# Patient Record
Sex: FEMALE | Race: WHITE | Employment: PART TIME | URBAN - METROPOLITAN AREA
[De-identification: names, ages, dates, MRNs, and addresses within clinical notes are randomized per-mention and may not be internally consistent; named-entity substitution may affect disease eponyms.]

---

## 2017-06-15 ENCOUNTER — OFFICE VISIT (OUTPATIENT)
Dept: INTERNAL MEDICINE CLINIC | Age: 57
End: 2017-06-15

## 2017-06-15 VITALS
SYSTOLIC BLOOD PRESSURE: 134 MMHG | HEIGHT: 62 IN | DIASTOLIC BLOOD PRESSURE: 84 MMHG | RESPIRATION RATE: 18 BRPM | OXYGEN SATURATION: 99 % | BODY MASS INDEX: 23.55 KG/M2 | WEIGHT: 128 LBS | TEMPERATURE: 98.6 F | HEART RATE: 93 BPM

## 2017-06-15 DIAGNOSIS — Z12.31 SCREENING MAMMOGRAM, ENCOUNTER FOR: ICD-10-CM

## 2017-06-15 DIAGNOSIS — R03.0 ELEVATED BLOOD PRESSURE READING: ICD-10-CM

## 2017-06-15 DIAGNOSIS — Z23 ENCOUNTER FOR IMMUNIZATION: ICD-10-CM

## 2017-06-15 DIAGNOSIS — Z13.1 SCREENING FOR DIABETES MELLITUS: ICD-10-CM

## 2017-06-15 DIAGNOSIS — Z13.21 ENCOUNTER FOR VITAMIN DEFICIENCY SCREENING: ICD-10-CM

## 2017-06-15 DIAGNOSIS — Z11.59 NEED FOR HEPATITIS C SCREENING TEST: ICD-10-CM

## 2017-06-15 DIAGNOSIS — Z00.00 GENERAL MEDICAL EXAM: Primary | ICD-10-CM

## 2017-06-15 DIAGNOSIS — F41.9 ANXIETY: ICD-10-CM

## 2017-06-15 DIAGNOSIS — Z12.11 SCREENING FOR COLON CANCER: ICD-10-CM

## 2017-06-15 RX ORDER — ERYTHROMYCIN 5 MG/G
OINTMENT OPHTHALMIC
COMMUNITY
Start: 2017-06-08 | End: 2017-10-07

## 2017-06-15 NOTE — PATIENT INSTRUCTIONS

## 2017-06-15 NOTE — MR AVS SNAPSHOT
Visit Information Date & Time Provider Department Dept. Phone Encounter #  
 6/15/2017 11:00 AM Monserrat Hanks, [place] [address] [place] [address],[address] [phone] [id number] Follow-up Instructions Return in about 3 months (around 9/15/2017) for pap smear, blood pressure check. Upcoming Health Maintenance Date Due Hepatitis C Screening 1960 Pneumococcal 19-64 Medium Risk (1 of 1 - PPSV23) 2/28/1979 DTaP/Tdap/Td series (1 - Tdap) 2/28/1981 FOBT Q 1 YEAR AGE 50-75 2/28/2010 BREAST CANCER SCRN MAMMOGRAM 6/23/2014 PAP AKA CERVICAL CYTOLOGY 6/23/2015 Allergies as of 6/15/2017  Review Complete On: 6/15/2017 By: Oneida Sanz No Known Allergies Current Immunizations  Never Reviewed Name Date Pneumococcal Conjugate (PCV-13)  Incomplete Tdap  Incomplete Not reviewed this visit You Were Diagnosed With   
  
 Codes Comments General medical exam    -  Primary ICD-10-CM: Z00.00 ICD-9-CM: V70.9 Anxiety     ICD-10-CM: F41.9 ICD-9-CM: 300.00 Elevated blood pressure reading     ICD-10-CM: R03.0 ICD-9-CM: 796.2 Encounter for vitamin deficiency screening     ICD-10-CM: Z13.21 ICD-9-CM: V77.99 Screening for colon cancer     ICD-10-CM: Z12.11 ICD-9-CM: V76.51 Screening mammogram, encounter for     ICD-10-CM: Z12.31 
ICD-9-CM: V76.12 Screening for diabetes mellitus     ICD-10-CM: Z13.1 ICD-9-CM: V77.1 Encounter for immunization     ICD-10-CM: V64 ICD-9-CM: V03.89 Need for hepatitis C screening test     ICD-10-CM: Z11.59 
ICD-9-CM: V73.89 Vitals BP Pulse Temp Resp Height(growth percentile) Weight(growth percentile) 134/84 (BP 1 Location: Right arm, BP Patient Position: Sitting) 93 98.6 °F (37 °C) (Oral) 18 5' 2\" (1.575 m) 128 lb (58.1 kg) LMP SpO2 BMI OB Status Smoking Status 03/14/2010 99% 23.41 kg/m2 Postmenopausal Current Every Day Smoker BMI and BSA Data Body Mass Index Body Surface Area  
 23.41 kg/m 2 1.59 m 2 Preferred Pharmacy Pharmacy Name Phone [facility], [address] [phone] Your Updated Medication List  
  
   
This list is accurate as of: 6/15/17 11:25 AM.  Always use your most recent med list.  
  
  
  
  
 erythromycin ophthalmic ointment Commonly known as:  ILOTYCIN  
  
 EXCEDRIN MIGRAINE PO Take  by mouth.  
  
 multivitamin tablet Commonly known as:  ONE A DAY Take 1 Tab by mouth daily. We Performed the Following CBC WITH AUTOMATED DIFF [99894 CPT(R)] HEMOGLOBIN A1C WITH EAG [47607 CPT(R)] HEPATITIS C AB [65311 CPT(R)] LIPID PANEL [62315 CPT(R)] METABOLIC PANEL, COMPREHENSIVE [23414 CPT(R)] PNEUMOCOCCAL CONJ VACCINE 13 VALENT IM [id number] CPT(R)] MO IMMUNIZ ADMIN,1 SINGLE/COMB VAC/TOXOID [id number] CPT(R)] MO IMMUNIZ,ADMIN,EACH ADDL [id number] CPT(R)] REFERRAL FOR COLONOSCOPY [SSD005 Custom] TETANUS, DIPHTHERIA TOXOIDS AND ACELLULAR PERTUSSIS VACCINE (TDAP), IN INDIVIDS. >=7, IM [id number] CPT(R)] TSH AND FREE T4 [23540 CPT(R)] VITAMIN D, 25 HYDROXY [id number] CPT(R)] Follow-up Instructions Return in about 3 months (around 9/15/2017) for pap smear, blood pressure check. To-Do List   
 06/15/2017 Imaging:  [facility] MAMMO BI SCREENING INCL CAD Referral Information Referral ID Referred By Referred To  
  
 [id number] [facility] [address] , [address] Phone: [phone] Fax: [phone] Visits Status Start Date End Date 1 New Request 6/15/17 6/15/18 If your referral has a status of pending review or denied, additional information will be sent to support the outcome of this decision. Patient Instructions DASH Diet: Care Instructions Your Care Instructions The DASH diet is an eating plan that can help lower your blood pressure. DASH stands for Dietary Approaches to Stop Hypertension. Hypertension is high blood pressure. The DASH diet focuses on eating foods that are high in calcium, potassium, and magnesium. These nutrients can lower blood pressure. The foods that are highest in these nutrients are fruits, vegetables, low-fat dairy products, nuts, seeds, and legumes. But taking calcium, potassium, and magnesium supplements instead of eating foods that are high in those nutrients does not have the same effect. The DASH diet also includes whole grains, fish, and poultry. The DASH diet is one of several lifestyle changes your doctor may recommend to lower your high blood pressure. Your doctor may also want you to decrease the amount of sodium in your diet. Lowering sodium while following the DASH diet can lower blood pressure even further than just the DASH diet alone. Follow-up care is a key part of your treatment and safety. Be sure to make and go to all appointments, and call your doctor if you are having problems. It's also a good idea to know your test results and keep a list of the medicines you take. How can you care for yourself at home? Following the DASH diet · Eat 4 to 5 servings of fruit each day. A serving is 1 medium-sized piece of fruit, ½ cup chopped or canned fruit, 1/4 cup dried fruit, or 4 ounces (½ cup) of fruit juice. Choose fruit more often than fruit juice. · Eat 4 to 5 servings of vegetables each day. A serving is 1 cup of lettuce or raw leafy vegetables, ½ cup of chopped or cooked vegetables, or 4 ounces (½ cup) of vegetable juice. Choose vegetables more often than vegetable juice. · Get 2 to 3 servings of low-fat and fat-free dairy each day. A serving is 8 ounces of milk, 1 cup of yogurt, or 1 ½ ounces of cheese. · Eat 6 to 8 servings of grains each day.  A serving is 1 slice of bread, 1 ounce of dry cereal, or ½ cup of cooked rice, pasta, or cooked cereal. Try to choose whole-grain products as much as possible. · Limit lean meat, poultry, and fish to 2 servings each day. A serving is 3 ounces, about the size of a deck of cards. · Eat 4 to 5 servings of nuts, seeds, and legumes (cooked dried beans, lentils, and split peas) each week. A serving is 1/3 cup of nuts, 2 tablespoons of seeds, or ½ cup of cooked beans or peas. · Limit fats and oils to 2 to 3 servings each day. A serving is 1 teaspoon of vegetable oil or 2 tablespoons of salad dressing. · Limit sweets and added sugars to 5 servings or less a week. A serving is 1 tablespoon jelly or jam, ½ cup sorbet, or 1 cup of lemonade. · Eat less than 2,300 milligrams (mg) of sodium a day. If you limit your sodium to 1,500 mg a day, you can lower your blood pressure even more. Tips for success · Start small. Do not try to make dramatic changes to your diet all at once. You might feel that you are missing out on your favorite foods and then be more likely to not follow the plan. Make small changes, and stick with them. Once those changes become habit, add a few more changes. · Try some of the following: ¨ Make it a goal to eat a fruit or vegetable at every meal and at snacks. This will make it easy to get the recommended amount of fruits and vegetables each day. ¨ Try yogurt topped with fruit and nuts for a snack or healthy dessert. ¨ Add lettuce, tomato, cucumber, and onion to sandwiches. ¨ Combine a ready-made pizza crust with low-fat mozzarella cheese and lots of vegetable toppings. Try using tomatoes, squash, spinach, broccoli, carrots, cauliflower, and onions. ¨ Have a variety of cut-up vegetables with a low-fat dip as an appetizer instead of chips and dip. ¨ Sprinkle sunflower seeds or chopped almonds over salads. Or try adding chopped walnuts or almonds to cooked vegetables. ¨ Try some vegetarian meals using beans and peas. Add garbanzo or kidney beans to salads. Make burritos and tacos with mashed moore beans or black beans. Where can you learn more? Go to http://samson-nella.info/. Enter O503 in the search box to learn more about \"DASH Diet: Care Instructions. \" Current as of: March 23, 2016 Content Version: 11.2 © 1157-0469 [employer]. Care instructions adapted under license by [employer] (which disclaims liability or warranty for this information). If you have questions about a medical condition or this instruction, always ask your healthcare professional. [address] any warranty or liability for your use of this information. Introducing [facility]! [facility] introduces iwi patient portal. Now you can access parts of your medical record, email your doctor's office, and request medication refills online. 1. In your internet browser, go to [email or web address] 2. Click on the First Time User? Click Here link in the Sign In box. You will see the New Member Sign Up page. 3. Enter your iwi Access Code exactly as it appears below. You will not need to use this code after youve completed the sign-up process. If you do not sign up before the expiration date, you must request a new code. · iwi Access Code: [email or web address] Expires: 9/13/2017 11:21 AM 
 
4. Enter the last four digits of your Social Security Number (xxxx) and Date of Birth (mm/dd/yyyy) as indicated and click Submit. You will be taken to the next sign-up page. 5. Create a iwi ID. This will be your iwi login ID and cannot be changed, so think of one that is secure and easy to remember. 6. Create a iwi password. You can change your password at any time. 7. Enter your Password Reset Question and Answer. This can be used at a later time if you forget your password. 8. Enter your e-mail address. You will receive e-mail notification when new information is available in [address] 9. Click Sign Up. You can now view and download portions of your medical record. 10. Click the Download Summary menu link to download a portable copy of your medical information. If you have questions, please visit the Frequently Asked Questions section of the [employer] website. Remember, [employer] is NOT to be used for urgent needs. For medical emergencies, dial 911. Now available from your iPhone and Android! Please provide this summary of care documentation to your next provider. Your primary care clinician is listed as PENELOPE  DAYAN [address] [place] [address]. If you have any questions after today's visit, please call [phone].

## 2017-06-15 NOTE — PROGRESS NOTES
Chief Complaint   Patient presents with   [facility] Bull[facility]     new patient    Hypertension     x1 week     1. Have you been to the ER, urgent care clinic since your last visit? Hospitalized since your last visit? No    2. Have you seen or consulted any other health care providers outside of the [address] [place] [address] since your last visit? Include any pap smears or colon screening.  No

## 2017-06-15 NOTE — PROGRESS NOTES
Ms. Ayesha Heredia is a new patient who is here to establish care. 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 Has not had a day off in a long time. Job has become stressful with difficult interaction with family. Had eye lid sx B/L and worked well. Since last Thursday has noted increased BP diastolic has been high 407/338 127/108   125/103 - brought in reads from home  Today BP is 134/84  Denies chest pain and shortness of breath        Glaucoma borderline: followed by Dr Patrick Wolff. Had not had a colonoscopy, no recent pap smear and no recent mammogram \" I didn't have time to take care of myself\"       Review of systems:  Constitutional: negative for fever, chills, weight loss, night sweats   Eyes : negative for vision changes, eye pain and discharge  Nose and Throat: negative for tinnitus, sore throat   Cardiovascular: negative for chest pain, palpitations and shortness of breath  Respiratory: negative for shortness of breath, cough and wheezing   Gastroinstestinal: negative for abdominal pain, nausea, vomiting, diarrhea, constipation, and blood in the stool  Musculoskeletal: negative for back ache and joint ache   Genitourinary: negative for dysuria, nocturia, polyuria and hematuria   Neurologic: Negative for focal weakness, numbness or incoordination  Skin: negative for rash, pruritus  Hematologic: negative for easy bruising      Past Medical History:   Diagnosis Date    CAD (coronary artery disease)     Emphysema     Glaucoma     borderline glaucoma    Tobacco abuse         History reviewed. No pertinent surgical history.     No Known Allergies    Current Outpatient Prescriptions on File Prior to Visit   Medication Sig Dispense Refill    multivitamin (ONE A DAY) tablet Take 1 Tab by mouth daily.  ASPIRIN/ACETAMINOPHEN/CAFFEINE (EXCEDRIN MIGRAINE PO) Take  by mouth. No current facility-administered medications on file prior to visit. family history includes Heart Attack in her father; High Cholesterol in her mother; Hypertension in her brother and mother; Obesity in her mother; Stroke in her father. Social History     Social History    Marital status:      Spouse name: N/A    Number of children: N/A    Years of education: N/A     Occupational History    Not on file. Social History Main Topics    Smoking status: Current Every Day Smoker     Packs/day: 0.50     Years: 35.00    Smokeless tobacco: Not on file    Alcohol use No    Drug use: No    Sexual activity: Yes     Partners: Male     Birth control/ protection: None     Other Topics Concern    Not on file     Social History Narrative       Visit Vitals    /84 (BP 1 Location: Right arm, BP Patient Position: Sitting)    Pulse 93    Temp 98.6 °F (37 °C) (Oral)    Resp 18    Ht 5' 2\" (1.575 m)    Wt 128 lb (58.1 kg)    LMP 03/14/2010    SpO2 99%    BMI 23.41 kg/m2     General:  Well appearing female no acute distress  HEENT:   PERRL,normal conjunctiva. External ear and canals normal, TMs normal.  Hearing normal to voice. Nose without edema or discharge, normal septum. Lips, teeth, gums normal.  Oropharynx: no erythema, no exudates, no lesions, normal tongue. Neck:  Supple. Thyroid normal size, nontender, without nodules. No carotid bruit. No masses or lymphadenopathy  Respiratory: no respiratory distress,  no wheezing, no rhonchi, no rales. No chest wall tenderness. Cardiovascular:  RRR, normal S1S2, no murmur. Gastrointestinal: normal bowel sounds, soft, nontender, without masses. No hepatosplenomegaly. Extremities +2 pulses, no edema, normal sensation   Musculoskeletal:  Normal gait. Normal digits and nails.   Normal strength and tone, no atrophy, and no abnormal movement. Skin:  No rash, no lesions, no ulcers. Skin warm, normal turgor, without induration or nodules. Neuro:  A and OX4, fluent speech, cranial nerves normal 2-12. Sensation normal to light touch. DTR symmetrical  Psych:  Normal affect      Lab Results   Component Value Date/Time    WBC 6.6 06/13/2014 08:05 PM    HGB 14.3 06/13/2014 08:05 PM    HCT 43.4 06/13/2014 08:05 PM    PLATELET 829 13/05/6059 08:05 PM    MCV 96.7 06/13/2014 08:05 PM     Lab Results   Component Value Date/Time    Sodium 141 06/13/2014 08:05 PM    Potassium 4.0 06/13/2014 08:05 PM    Chloride 106 06/13/2014 08:05 PM    CO2 28 06/13/2014 08:05 PM    Anion gap 7 06/13/2014 08:05 PM    Glucose 140 06/13/2014 08:05 PM    BUN 11 06/13/2014 08:05 PM    Creatinine 0.80 06/13/2014 08:05 PM    BUN/Creatinine ratio 14 06/13/2014 08:05 PM    GFR est AA >60 06/13/2014 08:05 PM    GFR est non-AA >60 06/13/2014 08:05 PM    Calcium 9.4 06/13/2014 08:05 PM     No results found for: CHOL, CHOLPOCT, CHOLX, CHLST, CHOLV, HDL, HDLPOC, LDL, LDLCPOC, LDLC, DLDLP, VLDLC, VLDL, TGLX, TRIGL, TRIGP, TGLPOCT, CHHD, CHHDX  No results found for: TSH, TSH2, TSH3, TSHP, TSHEXT, TSHEXT  No results found for: HBA1C, HGBE8, OJS5UMKZ, WIY7KWMS, LFC5SGSZ  No results found for: Ledy Saha, XQVID3, XQVID, VD3RIA                Assessment and Plan:     1. General medical exam  - METABOLIC PANEL, COMPREHENSIVE  - CBC WITH AUTOMATED DIFF    2. Anxiety: appears situational / increased stress at job   - counseled with self care/ work like balance   - reports elevated BP at home but BP is mildly elevated/ near normal in office. Advised to bring in blood pressure cuff to next appointment. Susetypect this is due to anxi  - given instructions on DASH diet   - TSH AND FREE T4    3.  Elevated blood pressure reading  - LIPID PANEL    4. Encounter for vitamin deficiency screening  - VITAMIN D, 25 HYDROXY    5. Screening for colon cancer  - REFERRAL FOR COLONOSCOPY    6. Screening mammogram, encounter for    - [facility] MAMMO BI SCREENING INCL CAD; Future    7. Screening for diabetes mellitus  - HEMOGLOBIN A1C WITH EAG    8. Encounter for immunization  - TETANUS, DIPHTHERIA TOXOIDS AND ACELLULAR PERTUSSIS VACCINE (TDAP), IN INDIVIDS. >=7, IM  - DC IMMUNIZ ADMIN,1 SINGLE/COMB VAC/TOXOID  - DC IMMUNIZ,ADMIN,EACH ADDL  - PNEUMOCOCCAL CONJ VACCINE 13 VALENT IM    9. Need for hepatitis C screening test  - HEPATITIS C AB    Follow-up Disposition:  Return in about 3 months (around 9/15/2017) for pap smear, blood pressure check.      Alyssa Ahumada MD

## 2017-06-16 LAB
25(OH)D3+25(OH)D2 SERPL-MCNC: 51.1 NG/ML (ref 30–100)
ALBUMIN SERPL-MCNC: 4.3 G/DL (ref 3.5–5.5)
ALBUMIN/GLOB SERPL: 2 {RATIO} (ref 1.2–2.2)
ALP SERPL-CCNC: 89 IU/L (ref 39–117)
ALT SERPL-CCNC: 14 IU/L (ref 0–32)
AST SERPL-CCNC: 18 IU/L (ref 0–40)
BASOPHILS # BLD AUTO: 0 X10E3/UL (ref 0–0.2)
BASOPHILS NFR BLD AUTO: 1 %
BILIRUB SERPL-MCNC: 0.4 MG/DL (ref 0–1.2)
BUN SERPL-MCNC: 15 MG/DL (ref 6–24)
BUN/CREAT SERPL: 20 (ref 9–23)
CALCIUM SERPL-MCNC: 8.8 MG/DL (ref 8.7–10.2)
CHLORIDE SERPL-SCNC: 101 MMOL/L (ref 96–106)
CHOLEST SERPL-MCNC: 198 MG/DL (ref 100–199)
CO2 SERPL-SCNC: 24 MMOL/L (ref 18–29)
CREAT SERPL-MCNC: 0.76 MG/DL (ref 0.57–1)
EOSINOPHIL # BLD AUTO: 0.1 X10E3/UL (ref 0–0.4)
EOSINOPHIL NFR BLD AUTO: 1 %
ERYTHROCYTE [DISTWIDTH] IN BLOOD BY AUTOMATED COUNT: 13.2 % (ref 12.3–15.4)
EST. AVERAGE GLUCOSE BLD GHB EST-MCNC: 117 MG/DL
GLOBULIN SER CALC-MCNC: 2.2 G/DL (ref 1.5–4.5)
GLUCOSE SERPL-MCNC: 99 MG/DL (ref 65–99)
HBA1C MFR BLD: 5.7 % (ref 4.8–5.6)
HCT VFR BLD AUTO: 41 % (ref 34–46.6)
HCV AB S/CO SERPL IA: <0.1 S/CO RATIO (ref 0–0.9)
HDLC SERPL-MCNC: 60 MG/DL
HGB BLD-MCNC: 13.7 G/DL (ref 11.1–15.9)
IMM GRANULOCYTES # BLD: 0 X10E3/UL (ref 0–0.1)
IMM GRANULOCYTES NFR BLD: 0 %
LDLC SERPL CALC-MCNC: 120 MG/DL (ref 0–99)
LYMPHOCYTES # BLD AUTO: 2.1 X10E3/UL (ref 0.7–3.1)
LYMPHOCYTES NFR BLD AUTO: 32 %
MCH RBC QN AUTO: 31 PG (ref 26.6–33)
MCHC RBC AUTO-ENTMCNC: 33.4 G/DL (ref 31.5–35.7)
MCV RBC AUTO: 93 FL (ref 79–97)
MONOCYTES # BLD AUTO: 0.4 X10E3/UL (ref 0.1–0.9)
MONOCYTES NFR BLD AUTO: 6 %
NEUTROPHILS # BLD AUTO: 3.9 X10E3/UL (ref 1.4–7)
NEUTROPHILS NFR BLD AUTO: 60 %
PLATELET # BLD AUTO: 240 X10E3/UL (ref 150–379)
POTASSIUM SERPL-SCNC: 4.2 MMOL/L (ref 3.5–5.2)
PROT SERPL-MCNC: 6.5 G/DL (ref 6–8.5)
RBC # BLD AUTO: 4.42 X10E6/UL (ref 3.77–5.28)
SODIUM SERPL-SCNC: 140 MMOL/L (ref 134–144)
T4 FREE SERPL-MCNC: 1.34 NG/DL (ref 0.82–1.77)
TRIGL SERPL-MCNC: 91 MG/DL (ref 0–149)
TSH SERPL DL<=0.005 MIU/L-ACNC: 0.72 UIU/ML (ref 0.45–4.5)
VLDLC SERPL CALC-MCNC: 18 MG/DL (ref 5–40)
WBC # BLD AUTO: 6.5 X10E3/UL (ref 3.4–10.8)

## 2017-06-16 NOTE — PROGRESS NOTES
Normal blood counts and normal kidney and liver function. Hepatitis C testing was negative. Thyroid functions normal  Vitamin D is normal continue with multivitamin daily-  Screening for diabetes: shows that you in the pre diabetic range with you sugars. It is important to increase exercise to 30 minutes daily and decrease the amount of carbohydrates ( sugars, bread, pasta, rice, potato) to ensure that you do not develop diabetes. We will repeat this in 3-6 months and if still elevated we can consider starting medication to prevent diabetes - Metformin   Cholesterol: LDL which is bad cholesterol is mildly elevated recommend increased exercise to 30 minutes daily and increased fiber intake - vegetables, fruits and oats and whole grain. Decrease fatty food intake. We will repeat choletserol level in one year.

## 2017-06-19 ENCOUNTER — TELEPHONE (OUTPATIENT)
Dept: INTERNAL MEDICINE CLINIC | Age: 57
End: 2017-06-19

## 2017-06-19 NOTE — TELEPHONE ENCOUNTER
Pt is returning a call regarding test results. Pt best contact [phone].        Message received & copied from [facility]

## 2017-06-19 NOTE — TELEPHONE ENCOUNTER
Spoke with patient. Reviewed results and recommendations per Dr. Nicole Monzon. Patient given opportunity to ask questions and all questions answered.

## 2017-06-19 NOTE — PROGRESS NOTES
Spoke with patient. Reviewed results and recommendations per Dr. Bertrand Bachelor. Patient given opportunity to ask questions and all questions answered.

## 2017-10-07 ENCOUNTER — HOSPITAL ENCOUNTER (EMERGENCY)
Age: 57
Discharge: HOME | End: 2017-10-07
Attending: EMERGENCY MEDICINE
Payer: COMMERCIAL

## 2017-10-07 ENCOUNTER — APPOINTMENT (OUTPATIENT)
Dept: GENERAL RADIOLOGY | Age: 57
End: 2017-10-07
Attending: EMERGENCY MEDICINE
Payer: COMMERCIAL

## 2017-10-07 ENCOUNTER — APPOINTMENT (OUTPATIENT)
Dept: ULTRASOUND IMAGING | Age: 57
End: 2017-10-07
Attending: EMERGENCY MEDICINE
Payer: COMMERCIAL

## 2017-10-07 VITALS
WEIGHT: 120 LBS | TEMPERATURE: 97.8 F | BODY MASS INDEX: 22.08 KG/M2 | DIASTOLIC BLOOD PRESSURE: 69 MMHG | SYSTOLIC BLOOD PRESSURE: 117 MMHG | RESPIRATION RATE: 21 BRPM | HEIGHT: 62 IN | OXYGEN SATURATION: 98 % | HEART RATE: 77 BPM

## 2017-10-07 DIAGNOSIS — R10.13 DYSPEPSIA: Primary | ICD-10-CM

## 2017-10-07 LAB
ABO + RH BLD: NORMAL
ALBUMIN SERPL-MCNC: 3.6 G/DL (ref 3.5–5)
ALBUMIN/GLOB SERPL: 1.2 {RATIO} (ref 1.1–2.2)
ALP SERPL-CCNC: 97 U/L (ref 45–117)
ALT SERPL-CCNC: 20 U/L (ref 12–78)
ANION GAP SERPL CALC-SCNC: 9 MMOL/L (ref 5–15)
AST SERPL-CCNC: 14 U/L (ref 15–37)
ATRIAL RATE: 73 BPM
BASOPHILS # BLD: 0 K/UL (ref 0–0.1)
BASOPHILS NFR BLD: 0 % (ref 0–1)
BILIRUB SERPL-MCNC: 0.5 MG/DL (ref 0.2–1)
BLOOD GROUP ANTIBODIES SERPL: NORMAL
BUN SERPL-MCNC: 18 MG/DL (ref 6–20)
BUN/CREAT SERPL: 25 (ref 12–20)
CALCIUM SERPL-MCNC: 8.8 MG/DL (ref 8.5–10.1)
CALCULATED P AXIS, ECG09: 30 DEGREES
CALCULATED R AXIS, ECG10: 25 DEGREES
CALCULATED T AXIS, ECG11: 23 DEGREES
CHLORIDE SERPL-SCNC: 106 MMOL/L (ref 97–108)
CK SERPL-CCNC: 138 U/L (ref 26–192)
CO2 SERPL-SCNC: 28 MMOL/L (ref 21–32)
CREAT SERPL-MCNC: 0.72 MG/DL (ref 0.55–1.02)
DIAGNOSIS, 93000: NORMAL
EOSINOPHIL # BLD: 0.1 K/UL (ref 0–0.4)
EOSINOPHIL NFR BLD: 1 % (ref 0–7)
ERYTHROCYTE [DISTWIDTH] IN BLOOD BY AUTOMATED COUNT: 13.1 % (ref 11.5–14.5)
GASTROCULT GAST QL: NEGATIVE
GLOBULIN SER CALC-MCNC: 2.9 G/DL (ref 2–4)
GLUCOSE SERPL-MCNC: 108 MG/DL (ref 65–100)
HCT VFR BLD AUTO: 41.4 % (ref 35–47)
HGB BLD-MCNC: 13.7 G/DL (ref 11.5–16)
LIPASE SERPL-CCNC: 107 U/L (ref 73–393)
LYMPHOCYTES # BLD: 1.3 K/UL (ref 0.8–3.5)
LYMPHOCYTES NFR BLD: 13 % (ref 12–49)
MCH RBC QN AUTO: 31.5 PG (ref 26–34)
MCHC RBC AUTO-ENTMCNC: 33.1 G/DL (ref 30–36.5)
MCV RBC AUTO: 95.2 FL (ref 80–99)
MONOCYTES # BLD: 0.7 K/UL (ref 0–1)
MONOCYTES NFR BLD: 7 % (ref 5–13)
NEUTS SEG # BLD: 8.1 K/UL (ref 1.8–8)
NEUTS SEG NFR BLD: 79 % (ref 32–75)
P-R INTERVAL, ECG05: 150 MS
PH GAST: 3 [PH] (ref 1.5–3.5)
PLATELET # BLD AUTO: 214 K/UL (ref 150–400)
POTASSIUM SERPL-SCNC: 3.8 MMOL/L (ref 3.5–5.1)
PROT SERPL-MCNC: 6.5 G/DL (ref 6.4–8.2)
Q-T INTERVAL, ECG07: 402 MS
QRS DURATION, ECG06: 76 MS
QTC CALCULATION (BEZET), ECG08: 442 MS
RBC # BLD AUTO: 4.35 M/UL (ref 3.8–5.2)
SODIUM SERPL-SCNC: 143 MMOL/L (ref 136–145)
SPECIMEN EXP DATE BLD: NORMAL
TROPONIN I SERPL-MCNC: <0.04 NG/ML
VENTRICULAR RATE, ECG03: 73 BPM
WBC # BLD AUTO: 10.2 K/UL (ref 3.6–11)

## 2017-10-07 PROCEDURE — 36415 COLL VENOUS BLD VENIPUNCTURE: CPT | Performed by: EMERGENCY MEDICINE

## 2017-10-07 PROCEDURE — 99285 EMERGENCY DEPT VISIT HI MDM: CPT

## 2017-10-07 PROCEDURE — 96374 THER/PROPH/DIAG INJ IV PUSH: CPT

## 2017-10-07 PROCEDURE — 71010 XR CHEST PORT: CPT

## 2017-10-07 PROCEDURE — 76705 ECHO EXAM OF ABDOMEN: CPT

## 2017-10-07 PROCEDURE — 74011250636 HC RX REV CODE- 250/636: Performed by: EMERGENCY MEDICINE

## 2017-10-07 PROCEDURE — 80053 COMPREHEN METABOLIC PANEL: CPT | Performed by: EMERGENCY MEDICINE

## 2017-10-07 PROCEDURE — 74011250636 HC RX REV CODE- 250/636

## 2017-10-07 PROCEDURE — 82271 OCCULT BLOOD OTHER SOURCES: CPT | Performed by: EMERGENCY MEDICINE

## 2017-10-07 PROCEDURE — 96376 TX/PRO/DX INJ SAME DRUG ADON: CPT

## 2017-10-07 PROCEDURE — 74011000250 HC RX REV CODE- 250: Performed by: EMERGENCY MEDICINE

## 2017-10-07 PROCEDURE — 86900 BLOOD TYPING SEROLOGIC ABO: CPT | Performed by: EMERGENCY MEDICINE

## 2017-10-07 PROCEDURE — 84484 ASSAY OF TROPONIN QUANT: CPT | Performed by: EMERGENCY MEDICINE

## 2017-10-07 PROCEDURE — 93005 ELECTROCARDIOGRAM TRACING: CPT

## 2017-10-07 PROCEDURE — 83690 ASSAY OF LIPASE: CPT | Performed by: EMERGENCY MEDICINE

## 2017-10-07 PROCEDURE — 96375 TX/PRO/DX INJ NEW DRUG ADDON: CPT

## 2017-10-07 PROCEDURE — 85025 COMPLETE CBC W/AUTO DIFF WBC: CPT | Performed by: EMERGENCY MEDICINE

## 2017-10-07 PROCEDURE — 82550 ASSAY OF CK (CPK): CPT | Performed by: EMERGENCY MEDICINE

## 2017-10-07 RX ORDER — ONDANSETRON 2 MG/ML
4 INJECTION INTRAMUSCULAR; INTRAVENOUS
Status: COMPLETED | OUTPATIENT
Start: 2017-10-07 | End: 2017-10-07

## 2017-10-07 RX ORDER — FAMOTIDINE 20 MG/1
40 TABLET, FILM COATED ORAL 2 TIMES DAILY
Qty: 28 TAB | Refills: 0 | Status: SHIPPED | OUTPATIENT
Start: 2017-10-07 | End: 2020-08-31

## 2017-10-07 RX ORDER — ONDANSETRON 2 MG/ML
INJECTION INTRAMUSCULAR; INTRAVENOUS
Status: DISCONTINUED
Start: 2017-10-07 | End: 2017-10-07 | Stop reason: HOSPADM

## 2017-10-07 RX ORDER — FAMOTIDINE 20 MG/1
20 TABLET, FILM COATED ORAL
Status: DISCONTINUED | OUTPATIENT
Start: 2017-10-07 | End: 2017-10-07

## 2017-10-07 RX ORDER — ONDANSETRON 2 MG/ML
INJECTION INTRAMUSCULAR; INTRAVENOUS
Status: COMPLETED
Start: 2017-10-07 | End: 2017-10-07

## 2017-10-07 RX ORDER — ONDANSETRON 2 MG/ML
8 INJECTION INTRAMUSCULAR; INTRAVENOUS
Status: COMPLETED | OUTPATIENT
Start: 2017-10-07 | End: 2017-10-07

## 2017-10-07 RX ORDER — FAMOTIDINE 20 MG/1
40 TABLET, FILM COATED ORAL 2 TIMES DAILY
Qty: 28 TAB | Refills: 0 | Status: SHIPPED | OUTPATIENT
Start: 2017-10-07 | End: 2017-10-07

## 2017-10-07 RX ADMIN — ONDANSETRON 8 MG: 2 INJECTION INTRAMUSCULAR; INTRAVENOUS at 04:43

## 2017-10-07 RX ADMIN — ONDANSETRON HYDROCHLORIDE 4 MG: 2 INJECTION, SOLUTION INTRAVENOUS at 07:25

## 2017-10-07 RX ADMIN — FAMOTIDINE 20 MG: 10 INJECTION, SOLUTION INTRAVENOUS at 04:43

## 2017-10-07 NOTE — ED NOTES
MD Tim Harman has reviewed discharge instructions with the patient. The patient verbalized understanding. Pt discharged with written instructions. No further concerns at this time. IV removed.

## 2017-10-07 NOTE — ED NOTES
Assumed care of pt. Bedside rounding completed, with white boards updated with correct information. Pt resting in bed. Call bell within reach. Pt VS within normal limits. Leif Nichole RN gave report. Pt was still dressed in clothes. Explained that the patient needed to changed into gown for US.

## 2017-10-07 NOTE — DISCHARGE INSTRUCTIONS
Indigestion (Dyspepsia or Heartburn): Care Instructions  Your Care Instructions  Sometimes it can be hard to pinpoint the cause of indigestion (dyspepsia or heartburn). Most cases of an upset stomach with bloating, burning, burping, and nausea are minor and go away within several hours. Home treatment and over-the-counter medicine often are able to control symptoms. But if you take medicine to relieve your indigestion without making diet and lifestyle changes, your symptoms are likely to return again and again. If you get indigestion often, it may be a sign of a more serious medical problem. Be sure to follow up with your doctor, who may want to do tests to be sure of the cause of your indigestion. Follow-up care is a key part of your treatment and safety. Be sure to make and go to all appointments, and call your doctor if you are having problems. It's also a good idea to know your test results and keep a list of the medicines you take. How can you care for yourself at home? · Your doctor may recommend over-the-counter medicine. For mild or occasional indigestion, antacids such as Tums, Gaviscon, Mylanta, or Maalox may help. Be careful when you take over-the-counter antacid medicines. Many of these medicines have aspirin in them. Read the label to make sure that you are not taking more than the recommended dose. Too much aspirin can be harmful. · Your doctor also may recommend over-the-counter acid reducers, such as Pepcid AC, Tagamet HB, Zantac 75, or Prilosec. Read and follow all instructions on the label. If you use these medicines often, talk with your doctor. · Change your eating habits. ¨ It's best to eat several small meals instead of two or three large meals. ¨ After you eat, wait 2 to 3 hours before you lie down. ¨ Chocolate, mint, and alcohol can make GERD worse. ¨ Spicy foods, foods that have a lot of acid (like tomatoes and oranges), and coffee can make GERD symptoms worse in some people. If your symptoms are worse after you eat a certain food, you may want to stop eating that food to see if your symptoms get better. · Do not smoke or chew tobacco. Smoking can make GERD worse. If you need help quitting, talk to your doctor about stop-smoking programs and medicines. These can increase your chances of quitting for good. · If you have GERD symptoms at night, raise the head of your bed 6 to 8 inches by putting the frame on blocks or placing a foam wedge under the head of your mattress. (Adding extra pillows does not work.)  · Do not wear tight clothing around your middle. · Lose weight if you need to. Losing just 5 to 10 pounds can help. · Do not take anti-inflammatory medicines, such as aspirin, ibuprofen (Advil, Motrin), or naproxen (Aleve). These can irritate the stomach. If you need a pain medicine, try acetaminophen (Tylenol), which does not cause stomach upset. When should you call for help? Call 911 anytime you think you may need emergency care. For example, call if:  · You passed out (lost consciousness). · You vomit blood or what looks like coffee grounds. · You pass maroon or very bloody stools. · You have chest pain or pressure. This may occur with:  ¨ Sweating. ¨ Shortness of breath. ¨ Nausea or vomiting. ¨ Pain that spreads from the chest to the neck, jaw, or one or both shoulders or arms. ¨ Feeling dizzy or lightheaded. ¨ A fast or uneven pulse. After calling 911, chew 1 adult-strength aspirin. Wait for an ambulance. Do not try to drive yourself. Call your doctor now or seek immediate medical care if:  · You have severe belly pain. · Your stools are black and tarlike or have streaks of blood. · You have trouble swallowing. · You are losing weight and do not know why. Watch closely for changes in your health, and be sure to contact your doctor if:  · You do not get better as expected. Where can you learn more? Go to http://alison.info/.   Enter K394 in the search box to learn more about \"Indigestion (Dyspepsia or Heartburn): Care Instructions. \"  Current as of: November 16, 2016  Content Version: 11.3  © 5934-5866 [employer] Care instructions adapted under license by [employer] (which disclaims liability or warranty for this information). If you have questions about a medical condition or this instruction, always ask your healthcare professional. [address] any warranty or liability for your use of this information. Abdominal Pain: Care Instructions  Your Care Instructions    Abdominal pain has many possible causes. Some aren't serious and get better on their own in a few days. Others need more testing and treatment. If your pain continues or gets worse, you need to be rechecked and may need more tests to find out what is wrong. You may need surgery to correct the problem. Don't ignore new symptoms, such as fever, nausea and vomiting, urination problems, pain that gets worse, and dizziness. These may be signs of a more serious problem. Your doctor may have recommended a follow-up visit in the next 8 to 12 hours. If you are not getting better, you may need more tests or treatment. The doctor has checked you carefully, but problems can develop later. If you notice any problems or new symptoms, get medical treatment right away. Follow-up care is a key part of your treatment and safety. Be sure to make and go to all appointments, and call your doctor if you are having problems. It's also a good idea to know your test results and keep a list of the medicines you take. How can you care for yourself at home? · Rest until you feel better. · To prevent dehydration, drink plenty of fluids, enough so that your urine is light yellow or clear like water. Choose water and other caffeine-free clear liquids until you feel better.  If you have kidney, heart, or liver disease and have to limit fluids, talk with your doctor before you increase the amount of fluids you drink. · If your stomach is upset, eat mild foods, such as rice, dry toast or crackers, bananas, and applesauce. Try eating several small meals instead of two or three large ones. · Wait until 48 hours after all symptoms have gone away before you have spicy foods, alcohol, and drinks that contain caffeine. · Do not eat foods that are high in fat. · Avoid anti-inflammatory medicines such as aspirin, ibuprofen (Advil, Motrin), and naproxen (Aleve). These can cause stomach upset. Talk to your doctor if you take daily aspirin for another health problem. When should you call for help? Call 911 anytime you think you may need emergency care. For example, call if:  · You passed out (lost consciousness). · You pass maroon or very bloody stools. · You vomit blood or what looks like coffee grounds. · You have new, severe belly pain. Call your doctor now or seek immediate medical care if:  · Your pain gets worse, especially if it becomes focused in one area of your belly. · You have a new or higher fever. · Your stools are black and look like tar, or they have streaks of blood. · You have unexpected vaginal bleeding. · You have symptoms of a urinary tract infection. These may include:  ¨ Pain when you urinate. ¨ Urinating more often than usual.  ¨ Blood in your urine. · You are dizzy or lightheaded, or you feel like you may faint. Watch closely for changes in your health, and be sure to contact your doctor if:  · You are not getting better after 1 day (24 hours). Where can you learn more? Go to http://samson-nella.info/. Enter A865 in the search box to learn more about \"Abdominal Pain: Care Instructions. \"  Current as of: March 20, 2017  Content Version: 11.3  © 2763-1968 [employer]. Care instructions adapted under license by [employer] (which disclaims liability or warranty for this information).  If you have questions about a medical condition or this instruction, always ask your healthcare professional. [place] [address] any warranty or liability for your use of this information.

## 2017-10-07 NOTE — ED TRIAGE NOTES
Pt had severe abdominal pain causing her to double over and vomit. Pain has since ceased. Coffee ground vomitus.

## 2017-10-07 NOTE — ED PROVIDER NOTES
[facility].  EMERGENCY DEPARTMENT HISTORY AND PHYSICAL EXAM       Date of Service: 10/7/2017   Patient Name: Sherif Chavarria   YOB: 1960  Medical Record Number: [id number]    History of Presenting Illness     Chief Complaint   Patient presents with    Nausea    Vomiting    Chest Pain        History Provided By:  patient    Additional History:   Sherif Chavarria is a 62 y.o. female with PMhx significant for CAD and glaucoma who presents via EMS to the ED with cc of sharp, intermittent 10/10 mid-abdominal pain since 10:00pm yesterday. Pt states she felt fine yesterday, but experienced waves of abdominal pain since going to sleep. She notes that because of the pain, she woke up. Pt additionally reports accompanying nausea and vomiting since onset of pain. Pt recalls she had experienced similar symptoms in the past but not to this extent. Pt notes of exacerbation of pain when eating. Pt denies taking any pain medication for relief of pain. Pt denies any recent long distance travels. 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 Pt denies any prior abdominal surgeries. Social Hx: + Tobacco (0.5 ppd), - EtOH, - Illicit Drugs    There are no other complaints, changes or physical findings at this time. Primary Care Provider: PROVIDER UNKNOWN     Past History     Past Medical History:   Past Medical History:   Diagnosis Date    CAD (coronary artery disease)     Emphysema     Glaucoma     borderline glaucoma    Tobacco abuse         Past Surgical History:   No past surgical history on file.      Family History:   Family History   Problem Relation Age of Onset    High Cholesterol Mother     Hypertension Mother     Obesity Mother     Stroke Father     Heart Attack Father     Hypertension Brother         Social History:   Social History   Substance Use Topics    Smoking status: Current Every Day Smoker     Packs/day: 0.50     Years: 35.00    Smokeless tobacco: Not on file    Alcohol use No        Allergies:   No Known Allergies      Review of Systems   Review of Systems   Constitutional: Negative for activity change, appetite change, chills, fever and unexpected weight change. HENT: Negative for congestion. Eyes: Negative for pain and visual disturbance. Respiratory: Negative for cough and shortness of breath. Cardiovascular: Negative for chest pain. Gastrointestinal: Positive for abdominal pain (mid-abdominal), nausea and vomiting. Negative for diarrhea. Genitourinary: Negative for dysuria. Musculoskeletal: Negative for back pain. Skin: Negative for rash. Neurological: Negative for headaches. Physical Exam  Physical Exam   Constitutional: She is oriented to person, place, and time. She appears well-developed and well-nourished. HENT:   Head: Normocephalic and atraumatic. Mouth/Throat: Oropharynx is clear and moist.   Eyes: Conjunctivae and EOM are normal. Pupils are equal, round, and reactive to light. Right eye exhibits no discharge. Left eye exhibits no discharge. Neck: Normal range of motion. Neck supple. Cardiovascular: Normal rate, regular rhythm and normal heart sounds. No murmur heard. Pulmonary/Chest: Effort normal and breath sounds normal. No respiratory distress. She has no wheezes. She has no rales. Abdominal: Soft. Bowel sounds are normal. She exhibits no distension. There is no tenderness. Musculoskeletal: Normal range of motion. She exhibits no edema. Neurological: She is alert and oriented to person, place, and time. No cranial nerve deficit. She exhibits normal muscle tone. Skin: Skin is warm and dry. No rash noted. She is not diaphoretic. Nursing note and vitals reviewed. Medical Decision Making   I am the first provider for this patient.      I reviewed the vital signs, available nursing notes, past medical history, past surgical history, family history and social history. Old Medical Records: Old medical records. Provider Notes:   Epigastric abdominal pain. Normal EKG. Currently w/o symptoms or tenderness. DDx: biliary colic, gastritis, dyspepsia, pancreatitis, ACS    ED Course:  4:03 AM   Initial assessment performed. The patients presenting problems have been discussed, and they are in agreement with the care plan formulated and outlined with them. I have encouraged them to ask questions as they arise throughout their visit. Progress Notes:   06:00 AM Discussed results. Patient remains pain free, but states she did have a short burst of pain while in the ED. States \"I know there is something wrong with me\". Discussed option of ultrasound, pt states she is willing to wait. Progress Notes:     ED Sign Out  6:08 AM   Patient care will be transferred to [address] [place] [address]. Discussed available diagnostic results and care plan at length. Pt made aware of provider change.    Written by Elizabeth Fowler ED Scribe, as dictated by Rukhsana White MD.    Diagnostic Study Results     Labs -      Recent Results (from the past 12 hour(s))   EKG, 12 LEAD, INITIAL    Collection Time: 10/07/17  3:45 AM   Result Value Ref Range    Ventricular Rate 73 BPM    Atrial Rate 73 BPM    P-R Interval 150 ms    QRS Duration 76 ms    Q-T Interval 402 ms    QTC Calculation (Bezet) 442 ms    Calculated P Axis 30 degrees    Calculated R Axis 25 degrees    Calculated T Axis 23 degrees    Diagnosis       Normal sinus rhythm  Possible Left atrial enlargement  Cannot rule out Anterior infarct , age undetermined  Abnormal ECG  When compared with ECG of 13-JUN-2014 20:06,  No significant change was found     CBC WITH AUTOMATED DIFF    Collection Time: 10/07/17  3:56 AM   Result Value Ref Range    WBC 10.2 3.6 - 11.0 K/uL    RBC 4.35 3.80 - 5.20 M/uL    HGB 13.7 11.5 - 16.0 g/dL    HCT 41.4 35.0 - 47.0 %    MCV 95.2 80.0 - 99.0 FL    MCH 31.5 26.0 - 34.0 PG    MCHC 33.1 30.0 - 36.5 g/dL    RDW 13.1 11.5 - 14.5 %    PLATELET 947 620 - 711 K/uL    NEUTROPHILS 79 (H) 32 - 75 %    LYMPHOCYTES 13 12 - 49 %    MONOCYTES 7 5 - 13 %    EOSINOPHILS 1 0 - 7 %    BASOPHILS 0 0 - 1 %    ABS. NEUTROPHILS 8.1 (H) 1.8 - 8.0 K/UL    ABS. LYMPHOCYTES 1.3 0.8 - 3.5 K/UL    ABS. MONOCYTES 0.7 0.0 - 1.0 K/UL    ABS. EOSINOPHILS 0.1 0.0 - 0.4 K/UL    ABS. BASOPHILS 0.0 0.0 - 0.1 K/UL   METABOLIC PANEL, COMPREHENSIVE    Collection Time: 10/07/17  3:56 AM   Result Value Ref Range    Sodium 143 136 - 145 mmol/L    Potassium 3.8 3.5 - 5.1 mmol/L    Chloride 106 97 - 108 mmol/L    CO2 28 21 - 32 mmol/L    Anion gap 9 5 - 15 mmol/L    Glucose 108 (H) 65 - 100 mg/dL    BUN 18 6 - 20 MG/DL    Creatinine 0.72 0.55 - 1.02 MG/DL    BUN/Creatinine ratio 25 (H) 12 - 20      GFR est AA >60 >60 ml/min/1.73m2    GFR est non-AA >60 >60 ml/min/1.73m2    Calcium 8.8 8.5 - 10.1 MG/DL    Bilirubin, total 0.5 0.2 - 1.0 MG/DL    ALT (SGPT) 20 12 - 78 U/L    AST (SGOT) 14 (L) 15 - 37 U/L    Alk. phosphatase 97 45 - 117 U/L    Protein, total 6.5 6.4 - 8.2 g/dL    Albumin 3.6 3.5 - 5.0 g/dL    Globulin 2.9 2.0 - 4.0 g/dL    A-G Ratio 1.2 1.1 - 2.2     CK W/ REFLX CKMB    Collection Time: 10/07/17  3:56 AM   Result Value Ref Range     26 - 192 U/L   TROPONIN I    Collection Time: 10/07/17  3:56 AM   Result Value Ref Range    Troponin-I, Qt. <0.04 <0.05 ng/mL   LIPASE    Collection Time: 10/07/17  3:56 AM   Result Value Ref Range    Lipase 107 73 - 393 U/L   TYPE & SCREEN    Collection Time: 10/07/17  3:57 AM   Result Value Ref Range    Crossmatch Expiration 10/10/2017     ABO/Rh(D) A POSITIVE     Antibody screen NEG    OCCULT BLOOD, GASTRIC    Collection Time: 10/07/17  6:01 AM   Result Value Ref Range    OCCULT BLOOD,GASTRIC NEGATIVE  NEG      pH,GASTRIC 3 1.5 - 3.5         Radiologic Studies -   EXAM:  ABDOMEN, LTD - US*   INDICATION: Intermittent colicky abdominal pain.   COMPARISON: None.     TECHNIQUE:   Limited real-time sonography of the right upper quadrant of the abdomen was  performed with multiple static images of the liver, gallbladder, pancreatic head  and right kidney obtained.     FINDINGS:  GALLBLADDER: The gallbladder is normal. There is no wall thickening or fluid  around the gallbladder. COMMON BILE DUCT: There is no biliary duct dilatation and the common duct  measures 6 mm in diameter. LIVER: The liver is mildly echogenic with no mass or other focal abnormality. LIVER VASCULATURE: The portal vein flow is towards the liver. The velocity is 23  cm/s. PANCREAS: The pancreatic head is normal.  RIGHT KIDNEY: The right kidney demonstrates normal echogenicity with no mass,  stone or hydronephrosis. The right kidney measures 9.84 cm in length.     The body and tail of the pancreas, left kidney, spleen and retroperitoneum were  not evaluated on this right upper quadrant examination.     IMPRESSION  IMPRESSION: Normal ultrasound examination of the right upper quadrant.              INDICATION: Chest pain. Abdominal pain. Nausea and vomiting.     COMPARISON: June 13, 2014     FINDINGS: AP portable imaging of the chest performed at 3:54 AM demonstrates a  stable cardiomediastinal silhouette. The lungs are clear bilaterally. No  significant osseous abnormalities are seen.     IMPRESSION  IMPRESSION: No evidence of acute cardiopulmonary process.            Vital Signs-Reviewed the patient's vital signs.    Patient Vitals for the past 12 hrs:   Temp Pulse Resp BP SpO2   10/07/17 0900 - 72 11 115/64 96 %   10/07/17 0830 - 76 12 115/61 96 %   10/07/17 0817 - 69 12 118/74 97 %   10/07/17 0815 - 71 12 118/74 96 %   10/07/17 0615 - - - 112/60 -   10/07/17 0345 - 81 16 126/70 -   10/07/17 0330 - 77 17 134/71 -   10/07/17 0328 97.8 °F (36.6 °C) 79 14 152/75 99 %       Medications Given in the ED:  Medications   ondansetron (ZOFRAN) 4 mg/2 mL injection (not administered)   ondansetron (ZOFRAN) injection 8 mg (8 mg IntraVENous Given 10/7/17 3753)   famotidine (PF) (PEPCID) 20 mg in sodium chloride 0.9 % 10 mL injection (20 mg IntraVENous Given 10/7/17 8203)   ondansetron (ZOFRAN) injection 4 mg (4 mg IntraVENous Given 10/7/17 9581)     EKG interpretation: (Preliminary) 3:45  Rhythm: normal sinus rhythm; and regular . Rate (approx.): 73; Axis: Possible left atrial enlargement; NM interval: normal; QRS interval: normal ; ST/T wave: normal; Other findings: normal.  Written by [employer], ED Scribe, as dictated by Sherri Alvarez MD    Diagnosis   Clinical Impression:   1. Dyspepsia         Plan:  1:   Follow-up Information     Follow up With Details Comments Contact Info    [facility] EMERGENCY DEPT  If symptoms worsen with increased pain and fevers. [address] [facility] [address]   [phone] [facility] [address] Lulu Lucero MD Call in 2 days Gastroenterology [address] [address] [phone]          2:   Current Discharge Medication List      START taking these medications    Details   famotidine (PEPCID) 20 mg tablet Take 2 Tabs by mouth two (2) times a day. Qty: 28 Tab, Refills: 0         CONTINUE these medications which have NOT CHANGED    Details   ASPIRIN/ACETAMINOPHEN/CAFFEINE (EXCEDRIN MIGRAINE PO) Take  by mouth.      multivitamin (ONE A DAY) tablet Take 1 Tab by mouth daily. Return to ED if Worse    Discharge Note:  9:25 AM  The pt is ready for discharge. The pt's signs, symptoms, diagnosis, and discharge instructions have been discussed and pt has conveyed their understanding. The pt is to follow up as recommended or return to ER should their symptoms worsen. Plan has been discussed and pt is in agreement.     This note is prepared by [employer], acting as a Scribe for Sherri Alvarez MD.    Sherri Alvarez MD: The scribe's documentation has been prepared under my direction and personally reviewed by me in its entirety. I confirm that the notes above accurately reflects all work, treatment, procedures, and medical decision making performed by me.  _______________________________   Attestations: This note is prepared by [employer], acting as Scribe for MD Jason Ling MD: The scribe's documentation has been prepared under my direction and personally reviewed by me in its entirety.  I confirm that the note above accurately reflects all work, treatment, procedures, and medical decision making performed by me.    _______________________________

## 2019-08-27 ENCOUNTER — OFFICE VISIT (OUTPATIENT)
Dept: INTERNAL MEDICINE CLINIC | Facility: CLINIC | Age: 59
End: 2019-08-27

## 2019-08-27 VITALS
DIASTOLIC BLOOD PRESSURE: 80 MMHG | HEIGHT: 62 IN | RESPIRATION RATE: 14 BRPM | BODY MASS INDEX: 24.22 KG/M2 | WEIGHT: 131.6 LBS | TEMPERATURE: 98 F | HEART RATE: 72 BPM | OXYGEN SATURATION: 97 % | SYSTOLIC BLOOD PRESSURE: 128 MMHG

## 2019-08-27 DIAGNOSIS — G89.29 CHRONIC MIDLINE THORACIC BACK PAIN: ICD-10-CM

## 2019-08-27 DIAGNOSIS — Z23 ENCOUNTER FOR IMMUNIZATION: ICD-10-CM

## 2019-08-27 DIAGNOSIS — Z12.39 SCREENING FOR BREAST CANCER: ICD-10-CM

## 2019-08-27 DIAGNOSIS — Z12.4 SCREENING FOR CERVICAL CANCER: ICD-10-CM

## 2019-08-27 DIAGNOSIS — Z72.0 TOBACCO USE: ICD-10-CM

## 2019-08-27 DIAGNOSIS — Z00.00 WELL ADULT EXAM: Primary | ICD-10-CM

## 2019-08-27 DIAGNOSIS — M54.6 CHRONIC MIDLINE THORACIC BACK PAIN: ICD-10-CM

## 2019-08-27 DIAGNOSIS — Z12.11 SCREENING FOR COLON CANCER: ICD-10-CM

## 2019-08-27 DIAGNOSIS — Z11.59 ENCOUNTER FOR HEPATITIS C SCREENING TEST FOR LOW RISK PATIENT: ICD-10-CM

## 2019-08-27 RX ORDER — BISMUTH SUBSALICYLATE 262 MG
1 TABLET,CHEWABLE ORAL DAILY
COMMUNITY
End: 2020-08-31

## 2019-08-27 RX ORDER — TRIAMCINOLONE ACETONIDE 55 UG/1
2 SPRAY, METERED NASAL DAILY
COMMUNITY
End: 2020-08-31

## 2019-08-27 RX ORDER — IBUPROFEN 200 MG
TABLET ORAL
COMMUNITY
End: 2020-08-31

## 2019-08-27 NOTE — PATIENT INSTRUCTIONS
Stopping Smoking: Care Instructions  Your Care Instructions  Cigarette smokers crave the nicotine in cigarettes. Giving it up is much harder than simply changing a habit. Your body has to stop craving the nicotine. It is hard to quit, but you can do it. There are many tools that people use to quit smoking. You may find that combining tools works best for you. There are several steps to quitting. First you get ready to quit. Then you get support to help you. After that, you learn new skills and behaviors to become a nonsmoker. For many people, a necessary step is getting and using medicine. Your doctor will help you set up the plan that best meets your needs. You may want to attend a smoking cessation program to help you quit smoking. When you choose a program, look for one that has proven success. Ask your doctor for ideas. You will greatly increase your chances of success if you take medicine as well as get counseling or join a cessation program.  Some of the changes you feel when you first quit tobacco are uncomfortable. Your body will miss the nicotine at first, and you may feel short-tempered and grumpy. You may have trouble sleeping or concentrating. Medicine can help you deal with these symptoms. You may struggle with changing your smoking habits and rituals. The last step is the tricky one: Be prepared for the smoking urge to continue for a time. This is a lot to deal with, but keep at it. You will feel better. Follow-up care is a key part of your treatment and safety. Be sure to make and go to all appointments, and call your doctor if you are having problems. It's also a good idea to know your test results and keep a list of the medicines you take. How can you care for yourself at home? · Ask your family, friends, and coworkers for support. You have a better chance of quitting if you have help and support.   · Join a support group, such as Nicotine Anonymous, for people who are trying to quit smoking. · Consider signing up for a smoking cessation program, such as the American Lung Association's Freedom from Smoking program.  · Get text messaging support. Go to the website at www.smokefree. gov to sign up for the [facility] program.  · Set a quit date. Pick your date carefully so that it is not right in the middle of a big deadline or stressful time. Once you quit, do not even take a puff. Get rid of all ashtrays and lighters after your last cigarette. Clean your house and your clothes so that they do not smell of smoke. · Learn how to be a nonsmoker. Think about ways you can avoid those things that make you reach for a cigarette. ? Avoid situations that put you at greatest risk for smoking. For some people, it is hard to have a drink with friends without smoking. For others, they might skip a coffee break with coworkers who smoke. ? Change your daily routine. Take a different route to work or eat a meal in a different place. · Cut down on stress. Calm yourself or release tension by doing an activity you enjoy, such as reading a book, taking a hot bath, or gardening. · Talk to your doctor or pharmacist about nicotine replacement therapy, which replaces the nicotine in your body. You still get nicotine but you do not use tobacco. Nicotine replacement products help you slowly reduce the amount of nicotine you need. These products come in several forms, many of them available over-the-counter:  ? Nicotine patches  ? Nicotine gum and lozenges  ? Nicotine inhaler  · Ask your doctor about bupropion (Wellbutrin) or varenicline (Chantix), which are prescription medicines. They do not contain nicotine. They help you by reducing withdrawal symptoms, such as stress and anxiety. · Some people find hypnosis, acupuncture, and massage helpful for ending the smoking habit. · Eat a healthy diet and get regular exercise. Having healthy habits will help your body move past its craving for nicotine.   · Be prepared to keep trying. Most people are not successful the first few times they try to quit. Do not get mad at yourself if you smoke again. Make a list of things you learned and think about when you want to try again, such as next week, next month, or next year. Where can you learn more? Go to http://samson-nella.info/. Enter J489 in the search box to learn more about \"Stopping Smoking: Care Instructions. \"  Current as of: 2018  Content Version: 12.1  © 6468-5654 [employer]. Care instructions adapted under license by [employer] (which disclaims liability or warranty for this information). If you have questions about a medical condition or this instruction, always ask your healthcare professional. [address] any warranty or liability for your use of this information. Vaccine Information Statement     Tdap (Tetanus, Diphtheria, Pertussis) Vaccine: What You Need to Know    Many Vaccine Information Statements are available in Citizen of the [place] and other languages. See www.immunize.org/vis. Hojas de Información Sobre Vacunas están disponibles en español y en muchos otros idiomas. Visite [facility].si    1. Why get vaccinated? Tetanus, diphtheria, and pertussis are very serious diseases. Tdap vaccine can protect us from these diseases. And, Tdap vaccine given to pregnant women can protect  babies against pertussis. TETANUS (Lockjaw) is rare in the [facility] today. It causes painful muscle tightening and stiffness, usually all over the body.  It can lead to tightening of muscles in the head and neck so you cant open your mouth, swallow, or sometimes even breathe. Tetanus kills about 1 out of 10 people who are infected even after receiving the best medical care. DIPHTHERIA is also rare in the [facility] today. It can cause a thick coating to form in the back of the throat.    It can lead to breathing problems, heart failure, paralysis, and death. PERTUSSIS (Whooping Cough) causes severe coughing spells, which can cause difficulty breathing, vomiting, and disturbed sleep.  It can also lead to weight loss, incontinence, and rib fractures. Up to 2 in 100 adolescents and 5 in 100 adults with pertussis are hospitalized or have complications, which could include pneumonia or death. These diseases are caused by bacteria. Diphtheria and pertussis are spread from person to person through secretions from coughing or sneezing. Tetanus enters the body through cuts, scratches, or wounds. Before vaccines, as many as 200,000 cases of diphtheria, 200,000 cases of pertussis, and hundreds of cases of tetanus, were reported in the [place] each year. Since vaccination began, reports of cases for tetanus and diphtheria have dropped by about 99% and for pertussis by about 80%. 2. Tdap vaccine    Tdap vaccine can protect adolescents and adults from tetanus, diphtheria, and pertussis. One dose of Tdap is routinely given at age 6 or 15. People who did not get Tdap at that age should get it as soon as possible. Tdap is especially important for health care professionals and anyone having close contact with a baby younger than 12 months. Pregnant women should get a dose of Tdap during every pregnancy, to protect the  from pertussis. Infants are most at risk for severe, life-threatening complications from pertussis. Another vaccine, called Td, protects against tetanus and diphtheria, but not pertussis. A Td booster should be given every 10 years. Tdap may be given as one of these boosters if you have never gotten Tdap before. Tdap may also be given after a severe cut or burn to prevent tetanus infection. Your doctor or the person giving you the vaccine can give you more information. Tdap may safely be given at the same time as other vaccines.     3. Some people should not get this vaccine     A person who has ever had a life-threatening allergic reaction after a previous dose of any diphtheria, tetanus or pertussis containing vaccine, OR has a severe allergy to any part of this vaccine, should not get Tdap vaccine. Tell the person giving the vaccine about any severe allergies.  Anyone who had coma or long repeated seizures within 7 days after a childhood dose of DTP or DTaP, or a previous dose of Tdap, should not get Tdap, unless a cause other than the vaccine was found. They can still get Td.  Talk to your doctor if you:  - have seizures or another nervous system problem,  - had severe pain or swelling after any vaccine containing diphtheria, tetanus or pertussis,   - ever had a condition called Guillain Barré Syndrome (GBS),  - arent feeling well on the day the shot is scheduled. 4. Risks    With any medicine, including vaccines, there is a chance of side effects. These are usually mild and go away on their own. Serious reactions are also possible but are rare. Most people who get Tdap vaccine do not have any problems with it.     Mild Problems following Tdap  (Did not interfere with activities)   Pain where the shot was given (about 3 in 4 adolescents or 2 in 3 adults)   Redness or swelling where the shot was given (about 1 person in 5)   Mild fever of at least 100.4°F (up to about 1 in 25 adolescents or 1 in 100 adults)   Headache (about 3 or 4 people in 10)   Tiredness (about 1 person in 3 or 4)   Nausea, vomiting, diarrhea, stomach ache (up to 1 in 4 adolescents or 1 in 10 adults)   Chills,  sore joints (about 1 person in 10)   Body aches (about 1 person in 3 or 4)    Rash, swollen glands (uncommon)    Moderate Problems following Tdap  (Interfered with activities, but did not require medical attention)   Pain where the shot was given (up to 1 in 5 or 6)    Redness or swelling where the shot was given (up to about 1 in 16 adolescents or 1 in 12 adults)   Fever over 102°F (about 1 in 100 adolescents or 1 in 250 adults)   Headache (about 1 in 7 adolescents or 1 in 10 adults)   Nausea, vomiting, diarrhea, stomach ache (up to 1 or 3 people in 100)   Swelling of the entire arm where the shot was given (up to about 1 in 500). Severe Problems following Tdap  (Unable to perform usual activities; required medical attention)   Swelling, severe pain, bleeding, and redness in the arm where the shot was given (rare). Problems that could happen after any vaccine:     People sometimes faint after a medical procedure, including vaccination. Sitting or lying down for about 15 minutes can help prevent fainting, and injuries caused by a fall. Tell your doctor if you feel dizzy, or have vision changes or ringing in the ears.  Some people get severe pain in the shoulder and have difficulty moving the arm where a shot was given. This happens very rarely.  Any medication can cause a severe allergic reaction. Such reactions from a vaccine are very rare, estimated at fewer than 1 in a million doses, and would happen within a few minutes to a few hours after the vaccination. As with any medicine, there is a very remote chance of a vaccine causing a serious injury or death. The safety of vaccines is always being monitored. For more information, visit: www.cdc.gov/vaccinesafety/    5. What if there is a serious problem? What should I look for?  Look for anything that concerns you, such as signs of a severe allergic reaction, very high fever, or unusual behavior.  Signs of a severe allergic reaction can include hives, swelling of the face and throat, difficulty breathing, a fast heartbeat, dizziness, and weakness. These would usually start a few minutes to a few hours after the vaccination. What should I do?    If you think it is a severe allergic reaction or other emergency that cant wait, call 9-1-1 or get the person to the nearest hospital. Otherwise, call your doctor.  Afterward, the reaction should be reported to the Vaccine Adverse Event Reporting System (VAERS). Your doctor might file this report, or you can do it yourself through the VAERS web site at www.vaers. hhs.gov, or by calling 4-657.487.5684. VAERS does not give medical advice. 6. The National Vaccine Injury Compensation Program    The [facility] Vaccine Injury Compensation Program (VICP) is a federal program that was created to compensate people who may have been injured by certain vaccines. Persons who believe they may have been injured by a vaccine can learn about the program and about filing a claim by calling 0-332.362.2534 or visiting the [employer] website at www.[facility].gov/vaccinecompensation. There is a time limit to file a claim for compensation. 7. How can I learn more?  Ask your doctor. He or she can give you the vaccine package insert or suggest other sources of information.  Call your local or state health department.  Contact the Centers for Disease Control and Prevention (CDC):  - Call 4-923.131.8132 (1-800-CDC-INFO) or  - Visit CDCs website at www.cdc.gov/vaccines      Vaccine Information Statement   Tdap Vaccine  (2/24/2015)  42 SARAH Ramirez [id number]    Department of Health and Human Services  Centers for Disease Control and Prevention    Office Use Only

## 2019-08-27 NOTE — PROGRESS NOTES
HPI  Ms. Tom Clarke is a 61y.o. year old female, she is seen today to establish care. Hasn't seen PCP in years. Says in recent past BP was higher - started taking olive leaf, and omega 3 and bp now normal. Likes to use non medicinal methods for any ailments if possible. 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 Works 6-8 hours 5 days per week. No chest pain, sob, dizziness, weakness. No n/v/abd pain, melena or brpbr. Has smoked her whole life - trying to quit. Feels she is gradually getting more sob which has prompted her to quit. Hasn't had colonoscopy - agrees to schedule. Has to help her mother - she lives alone, needs help with some things. Goes to the barn at her house with horses which helps her relax. Also has a boat but hasn't been out yet. Enjoys spending time with grandchildren. Gets occasional back pain. Started after caring for last client with parkinson's, crippling RA, was unable to walk. She would lift her. Remembers putting client back in bed, pulled her chest and back severely at the time. Says had what sounds like thoracic disc bulge by MRI- will have still have flares. Weight has been stable. Gained about 8# while sedentary caregiver last client. Chief Complaint   Patient presents with   [facility] Establish Care     Room 2A// previous PCP,  [address] - years ago // NON fasting // last mammo  // NO gyn         Prior to Admission medications    Medication Sig Start Date End Date Taking? Authorizing Provider   peg 400-propylene glycol (SYSTANE, PROPYLENE GLYCOL,) 0.4-0.3 % drop as needed. Yes Provider, Historical   multivitamin (ONE A DAY) tablet Take 1 Tab by mouth daily. Yes Provider, Historical   aspirin-acetaminophen-caffeine (EXCEDRIN ES) 250-250-65 mg per tablet Take 1 Tab by mouth.    Yes Provider, Historical   ibuprofen (ADVIL) 200 mg tablet Take  by mouth. Yes Provider, Historical   triamcinolone (NASACORT AQ) 55 mcg nasal inhaler 2 Sprays daily. Yes Provider, Historical         No Known Allergies      REVIEW OF SYSTEMS:  Per HPI    PHYSICAL EXAM:  Visit Vitals  /80 (BP 1 Location: Left arm, BP Patient Position: Sitting)   Pulse 72   Temp 98 °F (36.7 °C) (Oral)   Resp 14   Ht 5' 2\" (1.575 m)   Wt 131 lb 9.6 oz (59.7 kg)   SpO2 97%   BMI 24.07 kg/m²     Constitutional: Appears well-developed and well-nourished. No distress. HENT:   Head: Normocephalic and atraumatic. Eyes: No scleral icterus. PERRL  Mouth: OP clear without lesions, no pharyngeal exudate  Neck: no lad, no tm, supple   Cardiovascular: Normal S1/S2, regular rhythm. No murmurs, rubs, or gallops. Pulmonary/Chest: Effort normal and breath sounds normal. No respiratory distress. No wheezes, rhonchi, or rales. Abdomen: Soft, NT/ND, +BS, no rebound or guarding, no masses, no HSM appreciated. Back: no pain on palpation  Ext: No edema. Neurological: Alert. Psychiatric: Normal mood and affect. Behavior is normal.     No results found for: NA, K, CL, CO2, AGAP, GLU, BUN, CREA, BUCR, GFRAA, GFRNA, CA, TBIL, TBILI, GPT, SGOT, AP, TP, ALB, GLOB, AGRAT, ALT  No results found for: HBA1C, HGBE8, TMD9ACGZ, BEA9BNWH   No results found for: CHOL, CHOLPOCT, CHOLX, CHLST, CHOLV, HDL, HDLPOC, LDL, LDLCPOC, LDLC, DLDLP, VLDLC, VLDL, TGLX, TRIGL, TRIGP, TGLPOCT, CHHD, CHHDX       ASSESSMENT/PLAN  Diagnoses and all orders for this visit:    1. Well adult exam  -     LIPID PANEL  -     METABOLIC PANEL, COMPREHENSIVE  -     HEMOGLOBIN A1C WITH EAG    2. Screening for colon cancer  -     REFERRAL TO GASTROENTEROLOGY    3. Chronic midline thoracic back pain  Intermittent - related to old injury  4. Screening for breast cancer  -     [facility] MAMMO BI SCREENING INCL CAD; Future    5. Screening for cervical cancer  -     REFERRAL TO OBSTETRICS AND GYNECOLOGY    6.  Encounter for hepatitis C screening test for low risk patient  -     HEPATITIS C AB    7. Tobacco use  encouraged smoking cessation   8. Encounter for immunization  -     TETANUS, DIPHTHERIA TOXOIDS AND ACELLULAR PERTUSSIS VACCINE (TDAP), IN INDIVIDS. >=7, IM  -     [place] IMMUNIZ ADMIN,1 SINGLE/COMB VAC/TOXOID          Health Maintenance Due   Topic Date Due    Hepatitis C Screening  1960    Pneumococcal 0-64 years (1 of 1 - PPSV23) 02/28/1966    BREAST CANCER SCRN MAMMOGRAM  02/28/2010    FOBT Q 1 YEAR AGE 50-75  02/28/2010        Follow-up and Dispositions    · Return in about 1 year (around 8/27/2020) for well exam.            Reviewed plan of care. Patient has provided input and agrees with goals. The nurse provided the patient and/or family with advanced directive information if needed and encouraged the patient to provide a copy to the office when available.

## 2019-08-27 NOTE — PROGRESS NOTES
Austen Recio  Identified pt with two pt identifiers(name and ). Chief Complaint   Patient presents with   [address]     Room . Have you been to the ER, urgent care clinic since your last visit? Hospitalized since your last visit? NO    2. Have you seen or consulted any other health care providers outside of the [address] [place] [address] since your last visit? Include any pap smears or colon screening. NO      Provider notified of reason for visit, vitals and flowsheets obtained on patients. Patient received paperwork for advance directive during previous visit but has not completed at this time     Reviewed record In preparation for visit, huddled with provider and have obtained necessary documentation      Health Maintenance Due   Topic    Hepatitis C Screening     DTaP/Tdap/Td series (1 - Tdap)    PAP AKA CERVICAL CYTOLOGY     Shingrix Vaccine Age 50> (1 of 2)    BREAST CANCER SCRN MAMMOGRAM     FOBT Q 1 YEAR AGE 54-65     Influenza Age 5 to Adult        Wt Readings from Last 3 Encounters:   No data found for Wt     Temp Readings from Last 3 Encounters:   No data found for Temp     BP Readings from Last 3 Encounters:   No data found for BP     Pulse Readings from Last 3 Encounters:   No data found for Pulse     There were no vitals filed for this visit. Learning Assessment:  :     No flowsheet data found. Depression Screening:  :     3 most recent PHQ Screens 2019   Little interest or pleasure in doing things Not at all   Feeling down, depressed, irritable, or hopeless Not at all   Total Score PHQ 2 0       Fall Risk Assessment:  :     Fall Risk Assessment, last 12 mths 2019   Able to walk? Yes   Fall in past 12 months? No       Abuse Screening:  :     Abuse Screening Questionnaire 2019   Do you ever feel afraid of your partner? N   Are you in a relationship with someone who physically or mentally threatens you? N   Is it safe for you to go home?  Carlyn Kramer ADL Screening:  :     ADL Assessment 8/27/2019   Feeding yourself No Help Needed   Getting from bed to chair No Help Needed   Getting dressed No Help Needed   Bathing or showering No Help Needed   Walk across the room (includes cane/walker) No Help Needed   Using the telphone No Help Needed   Taking your medications No Help Needed   Preparing meals No Help Needed   Managing money (expenses/bills) No Help Needed   Moderately strenuous housework (laundry) No Help Needed   Shopping for personal items (toiletries/medicines) No Help Needed   Shopping for groceries No Help Needed   Driving No Help Needed   Climbing a flight of stairs No Help Needed   Getting to places beyond walking distances No Help Needed         Medication reconciliation up to date and corrected with patient at this time.

## 2019-08-28 LAB
ALBUMIN SERPL-MCNC: 4.4 G/DL (ref 3.5–5.5)
ALBUMIN/GLOB SERPL: 2 {RATIO} (ref 1.2–2.2)
ALP SERPL-CCNC: 95 IU/L (ref 39–117)
ALT SERPL-CCNC: 25 IU/L (ref 0–32)
AST SERPL-CCNC: 34 IU/L (ref 0–40)
BILIRUB SERPL-MCNC: 0.3 MG/DL (ref 0–1.2)
BUN SERPL-MCNC: 14 MG/DL (ref 6–24)
BUN/CREAT SERPL: 17 (ref 9–23)
CALCIUM SERPL-MCNC: 9.2 MG/DL (ref 8.7–10.2)
CHLORIDE SERPL-SCNC: 102 MMOL/L (ref 96–106)
CHOLEST SERPL-MCNC: 221 MG/DL (ref 100–199)
CO2 SERPL-SCNC: 25 MMOL/L (ref 20–29)
CREAT SERPL-MCNC: 0.82 MG/DL (ref 0.57–1)
EST. AVERAGE GLUCOSE BLD GHB EST-MCNC: 114 MG/DL
GLOBULIN SER CALC-MCNC: 2.2 G/DL (ref 1.5–4.5)
GLUCOSE SERPL-MCNC: 90 MG/DL (ref 65–99)
HBA1C MFR BLD: 5.6 % (ref 4.8–5.6)
HCV AB S/CO SERPL IA: 0.2 S/CO RATIO (ref 0–0.9)
HDLC SERPL-MCNC: 76 MG/DL
LDLC SERPL CALC-MCNC: 134 MG/DL (ref 0–99)
POTASSIUM SERPL-SCNC: 4.4 MMOL/L (ref 3.5–5.2)
PROT SERPL-MCNC: 6.6 G/DL (ref 6–8.5)
SODIUM SERPL-SCNC: 141 MMOL/L (ref 134–144)
TRIGL SERPL-MCNC: 57 MG/DL (ref 0–149)
VLDLC SERPL CALC-MCNC: 11 MG/DL (ref 5–40)

## 2020-08-31 ENCOUNTER — OFFICE VISIT (OUTPATIENT)
Dept: INTERNAL MEDICINE CLINIC | Age: 60
End: 2020-08-31
Payer: COMMERCIAL

## 2020-08-31 VITALS
RESPIRATION RATE: 16 BRPM | DIASTOLIC BLOOD PRESSURE: 85 MMHG | BODY MASS INDEX: 23.41 KG/M2 | TEMPERATURE: 98.3 F | SYSTOLIC BLOOD PRESSURE: 126 MMHG | WEIGHT: 127.2 LBS | OXYGEN SATURATION: 97 % | HEIGHT: 62 IN | HEART RATE: 79 BPM

## 2020-08-31 DIAGNOSIS — Z12.11 COLON CANCER SCREENING: ICD-10-CM

## 2020-08-31 DIAGNOSIS — Z12.4 CERVICAL CANCER SCREENING: ICD-10-CM

## 2020-08-31 DIAGNOSIS — Z72.0 TOBACCO USE: ICD-10-CM

## 2020-08-31 DIAGNOSIS — Z12.39 BREAST CANCER SCREENING: ICD-10-CM

## 2020-08-31 DIAGNOSIS — Z01.419 WELL WOMAN EXAM WITH ROUTINE GYNECOLOGICAL EXAM: Primary | ICD-10-CM

## 2020-08-31 PROBLEM — H04.129 DRY EYE: Status: ACTIVE | Noted: 2020-08-31

## 2020-08-31 PROCEDURE — 99396 PREV VISIT EST AGE 40-64: CPT | Performed by: INTERNAL MEDICINE

## 2020-08-31 NOTE — PROGRESS NOTES
Nita Beavers  Identified pt with two pt identifiers(name and ). Chief Complaint   Patient presents with    Annual Exam     Room 2B //     Well Woman       Reviewed record In preparation for visit and have obtained necessary documentation. 1. Have you been to the ER, urgent care clinic or hospitalized since your last visit? No     2. Have you seen or consulted any other health care providers outside of the [address] [place] [address] since your last visit? Include any pap smears or colon screening. No    Patient does not have an advance directive. Vitals reviewed with provider.     Health Maintenance reviewed:     Health Maintenance Due   Topic    FOBT Q1Y Age 54-65     Breast Cancer Screen Mammogram     PAP AKA CERVICAL CYTOLOGY     Pneumococcal 0-64 years (1 of 1 - PPSV23)          Wt Readings from Last 3 Encounters:   20 127 lb 3.2 oz (57.7 kg)   19 131 lb 9.6 oz (59.7 kg)   10/07/17 120 lb (54.4 kg)        Temp Readings from Last 3 Encounters:   20 98.3 °F (36.8 °C) (Oral)   19 98 °F (36.7 °C) (Oral)   10/07/17 97.8 °F (36.6 °C)        BP Readings from Last 3 Encounters:   20 126/85   19 128/80   10/07/17 117/69        Pulse Readings from Last 3 Encounters:   20 79   19 72   10/07/17 77        Vitals:    20 1258   BP: 126/85   Pulse: 79   Resp: 16   Temp: 98.3 °F (36.8 °C)   TempSrc: Oral   SpO2: (!) 89%   Weight: 127 lb 3.2 oz (57.7 kg)   Height: 5' 2\" (1.575 m)   PainSc:   0 - No pain   LMP: 2010          Learning Assessment:   :       Learning Assessment 2019 6/15/2017   PRIMARY LEARNER Patient Patient   HIGHEST LEVEL OF EDUCATION - PRIMARY LEARNER  - GRADUATED HIGH SCHOOL OR GED   PRIMARY LANGUAGE ENGLISH ENGLISH   LEARNER PREFERENCE PRIMARY READING DEMONSTRATION   ANSWERED BY patient patient   RELATIONSHIP SELF SELF        Depression Screening:   :       3 most recent PHQ Screens 2020   Little interest or pleasure in doing things Not at all   Feeling down, depressed, irritable, or hopeless Not at all   Total Score PHQ 2 0        Fall Risk Assessment:   :       Fall Risk Assessment, last 12 mths 8/27/2019   Able to walk? Yes   Fall in past 12 months? No        Abuse Screening:   :       Abuse Screening Questionnaire 8/31/2020 8/27/2019 6/15/2017   Do you ever feel afraid of your partner? N N N   Are you in a relationship with someone who physically or mentally threatens you? N N N   Is it safe for you to go home?  Y Y Y        ADL Screening:   :       ADL Assessment 8/27/2019   Feeding yourself No Help Needed   Getting from bed to chair No Help Needed   Getting dressed No Help Needed   Bathing or showering No Help Needed   Walk across the room (includes cane/walker) No Help Needed   Using the telphone No Help Needed   Taking your medications No Help Needed   Preparing meals No Help Needed   Managing money (expenses/bills) No Help Needed   Moderately strenuous housework (laundry) No Help Needed   Shopping for personal items (toiletries/medicines) No Help Needed   Shopping for groceries No Help Needed   Driving No Help Needed   Climbing a flight of stairs No Help Needed   Getting to places beyond walking distances No Help Needed

## 2020-08-31 NOTE — PROGRESS NOTES
HPI  Ms. Frances Lind is a 61y.o. year old female, she is seen today for well exam.   Has been out of work for 2 mos per her decision, was working as home health aid. Has been working on rental houses, caring for grandchildren and her mother, has been staying busy. No chest pain, sob, dizziness, weakness  All paps have been normal. Can't remember last one - thinks about 2011 after  from  - now remarried x 2 years. No vaginal discharge, burning, itching. No dysuria. Does BSE sometimes, hasn't noticed changes. No blood in stool or melena, no change in bowels. Never been screened for colon cancer. Smoking up to 10 cigs per day. Has been cutting back. Chief Complaint   Patient presents with    Annual Exam     Room 2B // 24 [facility]        Prior to Admission medications    Medication Sig Start Date End Date Taking? Authorizing Provider   aspirin-acetaminophen-caffeine (EXCEDRIN ES) 250-250-65 mg per tablet Take 1 Tab by mouth. Yes Provider, Historical   peg 400-propylene glycol (SYSTANE, PROPYLENE GLYCOL,) 0.4-0.3 % drop Administer 1 Drop to left eye as needed. Indications: Dry Eye   Yes Other, MD Henry   peg 400-propylene glycol (SYSTANE, PROPYLENE GLYCOL,) 0.4-0.3 % drop as needed. Provider, Historical   multivitamin (ONE A DAY) tablet Take 1 Tab by mouth daily. 8/31/20  Provider, Historical   ibuprofen (ADVIL) 200 mg tablet Take  by mouth.  8/31/20  Provider, Historical   triamcinolone (NASACORT AQ) 55 mcg nasal inhaler 2 Sprays daily. 8/31/20  Provider, Historical   famotidine (PEPCID) 20 mg tablet Take 2 Tabs by mouth two (2) times a day. 10/7/17 8/31/20  TermeerTyree MD   ASPIRIN/ACETAMINOPHEN/CAFFEINE (EXCEDRIN MIGRAINE PO) Take  by mouth.  8/31/20  Provider, Historical   multivitamin (ONE A DAY) tablet Take 1 Tab by mouth daily.   8/31/20  Provider, Historical         Allergies   Allergen Reactions    Mold Other (comments)     Severe Allergies REVIEW OF SYSTEMS:  Per HPI    PHYSICAL EXAM:  Visit Vitals  /85 (BP 1 Location: Left arm, BP Patient Position: Sitting)   Pulse 79   Temp 98.3 °F (36.8 °C) (Oral)   Resp 16   Ht 5' 2\" (1.575 m)   Wt 127 lb 3.2 oz (57.7 kg)   LMP 03/14/2010   SpO2 97%   BMI 23.27 kg/m²         Constitutional: Appears well-developed and well-nourished. No distress. HENT:   Head: Normocephalic and atraumatic. Eyes: Conjunctivae are normal. No scleral icterus. Neck: Neck supple. No thyromegaly present. Cardiovascular: Regular rhythm, normal S1/S2, no murmurs, rubs, or gallops. Pulmonary/Chest: Effort normal and breath sounds normal. No respiratory distress. No wheezes, rhonchi, or rales. Breasts: Symmetric, no masses, no skin changes, no axillary adenopathy. Abdominal: Soft. Bowel sounds are normal. No distension and no mass. No tenderness. No rebound and no guarding. Genitourinary: Vagina normal and uterus normal.  There is no lesion on the right labia. There is no lesion on the left labia. Cervix exhibits no motion tenderness, no discharge and no friability. Right adnexum displays no mass, no tenderness and no fullness. Left adnexum displays no mass, no tenderness and no fullness. No erythema or tenderness around the vagina. No discharge found. Extremities: No edema. Lymphadenopathy: No cervical adenopathy. Neurological: Alert, answers questions appropriately. Psychiatric: Normal mood and affect.  Behavior is normal.         Lab Results   Component Value Date/Time    Sodium 141 08/27/2019 12:08 PM    Potassium 4.4 08/27/2019 12:08 PM    Chloride 102 08/27/2019 12:08 PM    CO2 25 08/27/2019 12:08 PM    Anion gap 9 10/07/2017 03:56 AM    Glucose 90 08/27/2019 12:08 PM    BUN 14 08/27/2019 12:08 PM    Creatinine 0.82 08/27/2019 12:08 PM    BUN/Creatinine ratio 17 08/27/2019 12:08 PM    GFR est AA 91 08/27/2019 12:08 PM    GFR est non-AA 79 08/27/2019 12:08 PM    Calcium 9.2 08/27/2019 12:08 PM Bilirubin, total 0.3 08/27/2019 12:08 PM    Alk. phosphatase 95 08/27/2019 12:08 PM    Protein, total 6.6 08/27/2019 12:08 PM    Albumin 4.4 08/27/2019 12:08 PM    Globulin 2.9 10/07/2017 03:56 AM    A-G Ratio 2.0 08/27/2019 12:08 PM    ALT (SGPT) 25 08/27/2019 12:08 PM     Lab Results   Component Value Date/Time    Hemoglobin A1c 5.6 08/27/2019 12:08 PM    Hemoglobin A1c 5.7 (H) 06/15/2017 11:51 AM      Lab Results   Component Value Date/Time    Cholesterol, total 221 (H) 08/27/2019 12:08 PM    HDL Cholesterol 76 08/27/2019 12:08 PM    LDL, calculated 134 (H) 08/27/2019 12:08 PM    VLDL, calculated 11 08/27/2019 12:08 PM    Triglyceride 57 08/27/2019 12:08 PM          ASSESSMENT/PLAN  Diagnoses and all orders for this visit:    1. Well woman exam with routine gynecological exam  -     METABOLIC PANEL, COMPREHENSIVE; Future  -     LIPID PANEL; Future  -     HEMOGLOBIN A1C WITH EAG; Future    2. Breast cancer screening  -     BRETT 3D TRINI W MAMMO BI SCREENING INCL CAD; Future    3. Tobacco use    4. Cervical cancer screening  -     PAP (IMAGE GUIDED) + HPV HIGH RISK    5. Colon cancer screening  -     COLOGUARD TEST (FECAL DNA COLORECTAL CANCER SCREENING)      Declines flu vaccine  encouraged smoking cessation     Health Maintenance Due   Topic Date Due    FOBT Q1Y Age 54-65  02/28/2010    Breast Cancer Screen Mammogram  06/23/2014    PAP AKA CERVICAL CYTOLOGY  06/23/2015    Pneumococcal 0-64 years (1 of 1 - PPSV23) 08/10/2017        Follow-up and Dispositions    · Return in about 1 year (around 8/31/2021) for well exam.            Reviewed plan of care. Patient has provided input and agrees with goals. The nurse provided the patient and/or family with advanced directive information if needed and encouraged the patient to provide a copy to the office when available.

## 2020-09-08 LAB
CYTOLOGIST CVX/VAG CYTO: NORMAL
CYTOLOGY CVX/VAG DOC CYTO: NORMAL
CYTOLOGY CVX/VAG DOC THIN PREP: NORMAL
DX ICD CODE: NORMAL
HPV I/H RISK 1 DNA CVX QL PROBE+SIG AMP: NEGATIVE
Lab: NORMAL
OTHER STN SPEC: NORMAL
STAT OF ADQ CVX/VAG CYTO-IMP: NORMAL

## 2020-10-02 ENCOUNTER — TELEPHONE (OUTPATIENT)
Dept: INTERNAL MEDICINE CLINIC | Age: 60
End: 2020-10-02

## 2020-10-05 NOTE — TELEPHONE ENCOUNTER
The patient called back and verified their name and date of birth. I informed her on the negative cologuard results. She stated understanding and had no further questions.

## 2020-10-16 ENCOUNTER — HOSPITAL ENCOUNTER (OUTPATIENT)
Dept: MAMMOGRAPHY | Age: 60
Discharge: HOME | End: 2020-10-16
Attending: INTERNAL MEDICINE
Payer: COMMERCIAL

## 2020-10-16 DIAGNOSIS — Z12.39 BREAST CANCER SCREENING: ICD-10-CM

## 2020-10-16 PROCEDURE — 77063 BREAST TOMOSYNTHESIS BI: CPT

## 2020-12-14 ENCOUNTER — OFFICE VISIT (OUTPATIENT)
Dept: URGENT CARE | Age: 60
End: 2020-12-14
Payer: COMMERCIAL

## 2020-12-14 VITALS — HEART RATE: 74 BPM | OXYGEN SATURATION: 96 % | TEMPERATURE: 98.5 F | RESPIRATION RATE: 15 BRPM

## 2020-12-14 DIAGNOSIS — Z20.822 EXPOSURE TO COVID-19 VIRUS: Primary | ICD-10-CM

## 2020-12-14 PROCEDURE — 99202 OFFICE O/P NEW SF 15 MIN: CPT | Performed by: FAMILY MEDICINE

## 2020-12-14 NOTE — PROGRESS NOTES
This patient was seen at [address] [place] [address] [facility]gent [facility] while in their vehicle due to COVID-19 pandemic with PPE and focused examination in order to decrease community viral transmission. The patient/guardian gave verbal consent to treat. The history is provided by the patient.         Past Medical History:   Diagnosis Date    CAD (coronary artery disease)     Degenerative disc disease, thoracic     Emphysema     Glaucoma     borderline glaucoma    Mitral valve prolapse     Tobacco abuse         Past Surgical History:   Procedure Laterality Date    HX HEENT      Eyelid Surgery Bilateral     HX HEENT      eyelid revision         Family History   Problem Relation Age of Onset    Hypertension Mother     Stroke Father     High Cholesterol Mother     Obesity Mother     Heart Attack Father     Hypertension Brother         Social History     Socioeconomic History    Marital status:      Spouse name: Not on file    Number of children: Not on file    Years of education: Not on file    Highest education level: Not on file   Occupational History    Not on file   Social Needs    Financial resource strain: Not on file    Food insecurity     Worry: Not on file     Inability: Not on file    Transportation needs     Medical: Not on file     Non-medical: Not on file   Tobacco Use    Smoking status: Current Every Day Smoker     Packs/day: 0.50     Years: 40.00     Pack years: 20.00    Smokeless tobacco: Never Used   Substance and Sexual Activity    Alcohol use: Not Currently    Drug use: Never    Sexual activity: Yes     Partners: Male     Birth control/protection: None   Lifestyle    Physical activity     Days per week: Not on file     Minutes per session: Not on file    Stress: Not on file   Relationships    Social connections     Talks on phone: Not on file     Gets together: Not on file     Attends Jain service: Not on file     Active member of club or organization: Not on file Attends meetings of clubs or organizations: Not on file     Relationship status: Not on file    Intimate partner violence     Fear of current or ex partner: Not on file     Emotionally abused: Not on file     Physically abused: Not on file     Forced sexual activity: Not on file   Other Topics Concern    Not on file   Social History Narrative    ** Merged History Encounter **                     ALLERGIES: Mold    Review of Systems   All other systems reviewed and are negative. Vitals:    12/14/20 1412   Pulse: 74   Resp: 15   Temp: 98.5 °F (36.9 °C)   SpO2: 96%       Physical Exam  Vitals signs and nursing note reviewed. Constitutional:       General: She is not in acute distress. Appearance: She is not ill-appearing. Pulmonary:      Effort: Pulmonary effort is normal. No respiratory distress. Breath sounds: No wheezing. MDM    Procedures    ICD-10-CM ICD-9-CM    1. Exposure to COVID-19 virus  Z20.828 V01.79 NOVEL CORONAVIRUS (COVID-19)     No orders of the defined types were placed in this encounter. No results found for any visits on 12/14/20. The patients condition was discussed with the patient and they understand. The patient is to follow up with primary care doctor. If signs and symptoms become worse the pt is to go to the ER. The patient is to take medications as prescribed.

## 2020-12-16 LAB — SARS-COV-2, NAA: NOT DETECTED

## 2021-10-12 ENCOUNTER — OFFICE VISIT (OUTPATIENT)
Dept: INTERNAL MEDICINE CLINIC | Age: 61
End: 2021-10-12
Payer: COMMERCIAL

## 2021-10-12 VITALS
OXYGEN SATURATION: 94 % | SYSTOLIC BLOOD PRESSURE: 127 MMHG | BODY MASS INDEX: 24.59 KG/M2 | RESPIRATION RATE: 16 BRPM | WEIGHT: 133.6 LBS | HEART RATE: 78 BPM | DIASTOLIC BLOOD PRESSURE: 85 MMHG | HEIGHT: 62 IN | TEMPERATURE: 97.9 F

## 2021-10-12 DIAGNOSIS — Z00.00 WELL ADULT EXAM: Primary | ICD-10-CM

## 2021-10-12 DIAGNOSIS — Z12.31 SCREENING MAMMOGRAM FOR BREAST CANCER: ICD-10-CM

## 2021-10-12 PROCEDURE — 99396 PREV VISIT EST AGE 40-64: CPT | Performed by: INTERNAL MEDICINE

## 2021-10-12 RX ORDER — MULTIVITAMIN
1 CAPSULE ORAL DAILY
COMMUNITY
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
## 2021-10-12 NOTE — PROGRESS NOTES
Brittni Mccray  Identified pt with two pt identifiers(name and ). Chief Complaint   Patient presents with    Well Woman       Reviewed record In preparation for visit and have obtained necessary documentation. 1. Have you been to the ER, urgent care clinic or hospitalized since your last visit? No     2. Have you seen or consulted any other health care providers outside of the [address] [place] [address] since your last visit? Include any pap smears or colon screening. No    Patient does not have an advance directive. Vitals reviewed with provider.     Health Maintenance reviewed:     Health Maintenance Due   Topic    Low dose CT lung screening     Pneumococcal 0-64 years (1 of 2 - PPSV23)        Wt Readings from Last 3 Encounters:   10/12/21 133 lb 9.6 oz (60.6 kg)   20 127 lb 3.2 oz (57.7 kg)   19 131 lb 9.6 oz (59.7 kg)      Temp Readings from Last 3 Encounters:   10/12/21 97.9 °F (36.6 °C) (Oral)   20 98.5 °F (36.9 °C)   20 98.3 °F (36.8 °C) (Oral)      BP Readings from Last 3 Encounters:   10/12/21 127/85   20 126/85   19 128/80      Pulse Readings from Last 3 Encounters:   10/12/21 78   20 74   20 79      Vitals:    10/12/21 1333   BP: 127/85   Pulse: 78   Resp: 16   Temp: 97.9 °F (36.6 °C)   TempSrc: Oral   SpO2: 94%   Weight: 133 lb 9.6 oz (60.6 kg)   Height: 5' 2\" (1.575 m)   PainSc:   0 - No pain   LMP: 2010          Learning Assessment:   :     Learning Assessment 2019 6/15/2017   PRIMARY LEARNER Patient Patient   HIGHEST LEVEL OF EDUCATION - PRIMARY LEARNER  - GRADUATED HIGH SCHOOL OR GED   PRIMARY LANGUAGE ENGLISH ENGLISH   LEARNER PREFERENCE PRIMARY READING DEMONSTRATION   ANSWERED BY patient patient   RELATIONSHIP SELF SELF        Depression Screening:   :     3 most recent PHQ Screens 10/12/2021   Little interest or pleasure in doing things Not at all   Feeling down, depressed, irritable, or hopeless Not at all   Total Score PHQ 2 0        Fall Risk Assessment:   :     Fall Risk Assessment, last 12 mths 10/12/2021   Able to walk? Yes   Fall in past 12 months? 0   Do you feel unsteady? 0   Are you worried about falling 0        Abuse Screening:   :     Abuse Screening Questionnaire 10/12/2021 8/31/2020 8/27/2019 6/15/2017   Do you ever feel afraid of your partner? N N N N   Are you in a relationship with someone who physically or mentally threatens you? N N N N   Is it safe for you to go home?  Y Y Y Y        ADL Screening:   :     ADL Assessment 10/12/2021   Feeding yourself No Help Needed   Getting from bed to chair No Help Needed   Getting dressed No Help Needed   Bathing or showering No Help Needed   Walk across the room (includes cane/walker) No Help Needed   Using the telphone No Help Needed   Taking your medications No Help Needed   Preparing meals No Help Needed   Managing money (expenses/bills) No Help Needed   Moderately strenuous housework (laundry) No Help Needed   Shopping for personal items (toiletries/medicines) No Help Needed   Shopping for groceries No Help Needed   Driving No Help Needed   Climbing a flight of stairs No Help Needed   Getting to places beyond walking distances No Help Needed

## 2022-03-19 PROBLEM — H04.129 DRY EYE: Status: ACTIVE | Noted: 2020-08-31

## 2022-03-20 PROBLEM — Z72.0 TOBACCO USE: Status: ACTIVE | Noted: 2019-08-27

## 2022-08-30 ENCOUNTER — NURSE TRIAGE (OUTPATIENT)
Dept: OTHER | Facility: CLINIC | Age: 62
End: 2022-08-30

## 2022-08-30 NOTE — TELEPHONE ENCOUNTER
Received call from [facility] at [facility] with Red Flag Complaint. Subjective: Caller states \"A few weeks ago I was walking and I felt like something inside of me ripped. \"     Current Symptoms: Patient believes either her bladder or uterus is in her vagina    States feels a \"tiny bit\" protruding from her vagina and is urinating more than usual    Also reports nausea    States can easily push bulge into vagina    States feels as if she is emptying her  bladder completely. Onset: 2 weeks ago;     Pain Severity: 0/10; Temperature: denies       Denies - vaginal bleeding or discharge      Recommended disposition: See PCP within 3 Days    Care advice provided, patient verbalizes understanding; denies any other questions or concerns; instructed to call back for any new or worsening symptoms. Patient/Caller agrees with recommended disposition; writer provided warm transfer to [facility] at [facility] for appointment scheduling    Attention Provider: Thank you for allowing me to participate in the care of your patient. The patient was connected to triage in response to information provided to the E[facility]. Please do not respond through this encounter as the response is not directed to a shared pool.         Reason for Disposition   Tender lump (swelling or \"ball\") at vaginal opening    Protocols used: Vaginal Symptoms-ADULT-[place]

## 2022-08-30 NOTE — TELEPHONE ENCOUNTER
I spoke with the patient and verified them by name and date of birth. I informed her that there is nothing we can do here in the office for that issue. She will need to see GYN. I gave her the contact information for [facility]. She stated understanding & will call them.

## 2022-09-29 ENCOUNTER — TELEPHONE (OUTPATIENT)
Dept: INTERNAL MEDICINE CLINIC | Age: 62
End: 2022-09-29

## 2022-09-29 ENCOUNTER — NURSE TRIAGE (OUTPATIENT)
Dept: OTHER | Facility: CLINIC | Age: 62
End: 2022-09-29

## 2022-09-29 NOTE — TELEPHONE ENCOUNTER
Patient would like for the nurse to give her a call back because something is going on inside of her lower part.

## 2022-09-29 NOTE — TELEPHONE ENCOUNTER
I called the patient and verified them by name and date of birth. 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 We told her to go to GYN and they told her it was not her uterus so she needed to see Dr. Bruna Mas. Saw Dr. Linda Dior on yesterday & they did a procedure yesterday to hold the intestines from protruding into the vagina. Will need surgery to have it corrected & recommended a total hysterectomy. From day 1 has been nauseous and felt that stomach is ripping. Other providers think that something else is wrong and needs to see Dr. Bruna Mas. Best thing she was told yesterday was that she can get an ultrasound done or an upper endoscopy. Also recommend Dr. Bruna Mas order a colonoscopy to see what else is going on. I scheduled her for a visit on next week with Dr. Bruna Mas.

## 2022-09-29 NOTE — TELEPHONE ENCOUNTER
Received call from [facility] at [facility] with Red Flag Complaint. Subjective: Caller states \"It started when I felt something tear inside of me. I called Dr. Frantz Motta office. I felt something come out of me. I thought it was my uterus. They let me talk to someone on the phone. They said I needed to be seen. They said there was nothing that could be done for me. They said to see your gynecologist. I told them I didn't have one. They hung up on me. I saw Yessi Shepherd who did exam who said it was my intestine protruding into my vagina. They sent me to Dr. Melania Perry who I saw yesterday. They inserted pessary ring to hold the intestines to keep it from protruding. I am nauseous. In my stomach area both agreed something else is going on with me. I still have this excruciating burning and nauseous all the time. They said to go to your PCP. They said I need ultrasound on upper stomach to see if there is something torn there as well. I am scheduled to have surgery because of this because of how bad this is and I have to have a hysterectomy on top of having the intestines because the wall is so thin. \"     Current Symptoms: nausea without emesis. Onset: 2 weeks ago; constant    Associated Symptoms: abd pain. Under treatment for intestinal complication protruding through vagina. No feeling with urination-informed could be side effect from pessary ring placement. Pain Severity: 3/10; stinging; constant. Intensity ranging. Temperature: denies     What has been tried: eating crackers to aid in nausea. LMP: NA Pregnant: NA    Recommended disposition: See in Office Today or Tomorrow    Care advice provided, patient verbalizes understanding; denies any other questions or concerns; instructed to call back for any new or worsening symptoms. Patient/Caller agrees with recommended disposition; writer provided warm transfer to [facility] at [facility] for appointment scheduling    Attention Provider:   Thank you for allowing me to participate in the care of your patient. The patient was connected to triage in response to information provided to the ECC. Please do not respond through this encounter as the response is not directed to a shared pool.       Reason for Disposition   Patient wants to be seen    Protocols used: Nausea-ADULT-[place]

## 2022-10-06 ENCOUNTER — OFFICE VISIT (OUTPATIENT)
Dept: INTERNAL MEDICINE CLINIC | Age: 62
End: 2022-10-06
Payer: COMMERCIAL

## 2022-10-06 VITALS
HEIGHT: 62 IN | RESPIRATION RATE: 16 BRPM | DIASTOLIC BLOOD PRESSURE: 90 MMHG | OXYGEN SATURATION: 98 % | SYSTOLIC BLOOD PRESSURE: 144 MMHG | TEMPERATURE: 97.9 F | WEIGHT: 150.6 LBS | BODY MASS INDEX: 27.71 KG/M2 | HEART RATE: 86 BPM

## 2022-10-06 DIAGNOSIS — R03.0 ELEVATED BP WITHOUT DIAGNOSIS OF HYPERTENSION: ICD-10-CM

## 2022-10-06 DIAGNOSIS — R10.13 EPIGASTRIC PAIN: ICD-10-CM

## 2022-10-06 DIAGNOSIS — R10.30 LOWER ABDOMINAL PAIN: ICD-10-CM

## 2022-10-06 DIAGNOSIS — R11.0 NAUSEA: Primary | ICD-10-CM

## 2022-10-06 DIAGNOSIS — R10.2 PELVIC PAIN: ICD-10-CM

## 2022-10-06 DIAGNOSIS — N81.6 RECTOCELE: ICD-10-CM

## 2022-10-06 PROCEDURE — 99215 OFFICE O/P EST HI 40 MIN: CPT | Performed by: INTERNAL MEDICINE

## 2022-10-06 RX ORDER — FAMOTIDINE 40 MG/1
40 TABLET, FILM COATED ORAL DAILY
Qty: 30 TABLET | Refills: 2 | Status: SHIPPED | OUTPATIENT
Start: 2022-10-06

## 2022-10-06 NOTE — PROGRESS NOTES
HPI  Ms. Arno Lesch is a 58y.o. year old female, she is seen today for what sounds like rectocele - has pessary now and will follow up with gynecology. Other concerns as well  Had shingles on right flank in May  When outside in yard in June or July and felt ripping sensation below left breast, has had stinging sensation in upper abdomen, sore to touch, anytime she eats since will feel nauseous, and not eating as much. Says she started gaining weight around June or July, about 2# since. No chest pain or sob, no blood in stool or melena. No dysphagia but if she eats when she feels nauseous she will vomit. No nausea with fluids. No PND or orthopnea. No gerd. Feet were swollen when she had shingles, not now. Had dental abscess as well in May when she had shingles. No cough or wheezing, no f/c, has occasional night sweats since menopause - better than in the past.   Stopped smoking in February 2022. Has been feeling more tired than usual.   Has had lower abdominal pain when trying to stand up from stooped position. Feels more like hard pressure that makes it difficult to stand up. Has also been told she needs colonoscopy. Scheduled for mammogram this month      Chief Complaint   Patient presents with    Shingles     Patient had shingles, states she still feels like she has it but on the inside of her body. 4 weeks ago she noticed she was swollen in her body, when she bent down to shave legs her vaginal area a cyst was noticed coming out of her vaginal area (by ). Was told by Gynecologist stated she had intestinal prolapse. Prior to Admission medications    Medication Sig Start Date End Date Taking? Authorizing Provider   famotidine (PEPCID) 40 mg tablet Take 1 Tablet by mouth daily. 10/6/22  Yes Trina Sanchez MD   multivitamin capsule Take 1 Capsule by mouth daily. Yes Provider, Historical   ZINC ACETATE PO Take  by mouth.    Yes Provider, Historical   calcium carb-vitamin D3-vit K2 600 mg-1,000 unit-90 mcg tab Take  by mouth. Yes Provider, Historical         Allergies   Allergen Reactions    Mold Other (comments)     Severe Allergies         REVIEW OF SYSTEMS:  Per HPI    PHYSICAL EXAM:  Visit Vitals  BP (!) 144/90   Pulse 86   Temp 97.9 °F (36.6 °C) (Oral)   Resp 16   Ht 5' 2\" (1.575 m)   Wt 150 lb 9.6 oz (68.3 kg)   LMP 03/14/2010   SpO2 98%   BMI 27.55 kg/m²     Constitutional: Appears well-developed and well-nourished. No distress. HENT:   Head: Normocephalic and atraumatic. Eyes: No scleral icterus. Neck: no lad, no tm, supple   Cardiovascular: Normal S1/S2, regular rhythm. No murmurs, rubs, or gallops. Pulmonary/Chest: Effort normal and breath sounds normal. No respiratory distress. No wheezes, rhonchi, or rales. Abdomen: Soft, +pain in epigastric region/ND, +BS, no rebound or guarding, no masses, no HSM appreciated. Ext: No edema. Neurological: Alert. Psychiatric: Normal mood and affect. Behavior is normal.     Lab Results   Component Value Date/Time    Sodium 141 08/27/2019 12:08 PM    Potassium 4.4 08/27/2019 12:08 PM    Chloride 102 08/27/2019 12:08 PM    CO2 25 08/27/2019 12:08 PM    Anion gap 9 10/07/2017 03:56 AM    Glucose 90 08/27/2019 12:08 PM    BUN 14 08/27/2019 12:08 PM    Creatinine 0.82 08/27/2019 12:08 PM    BUN/Creatinine ratio 17 08/27/2019 12:08 PM    GFR est AA 91 08/27/2019 12:08 PM    GFR est non-AA 79 08/27/2019 12:08 PM    Calcium 9.2 08/27/2019 12:08 PM    Bilirubin, total 0.3 08/27/2019 12:08 PM    Alk.  phosphatase 95 08/27/2019 12:08 PM    Protein, total 6.6 08/27/2019 12:08 PM    Albumin 4.4 08/27/2019 12:08 PM    Globulin 2.9 10/07/2017 03:56 AM    A-G Ratio 2.0 08/27/2019 12:08 PM    ALT (SGPT) 25 08/27/2019 12:08 PM     Lab Results   Component Value Date/Time    Hemoglobin A1c 5.6 08/27/2019 12:08 PM    Hemoglobin A1c 5.7 (H) 06/15/2017 11:51 AM      Lab Results   Component Value Date/Time    Cholesterol, total 221 (H) 08/27/2019 12:08 PM    HDL Cholesterol 76 08/27/2019 12:08 PM    LDL, calculated 134 (H) 08/27/2019 12:08 PM    VLDL, calculated 11 08/27/2019 12:08 PM    Triglyceride 57 08/27/2019 12:08 PM          ASSESSMENT/PLAN  Diagnoses and all orders for this visit:    1. Nausea  -     REFERRAL TO GASTROENTEROLOGY  -     famotidine (PEPCID) 40 mg tablet; Take 1 Tablet by mouth daily. -     CT ABD PELV WO CONT; Future    2. Epigastric pain  -     REFERRAL TO GASTROENTEROLOGY  -     CT ABD PELV WO CONT; Future    3. Lower abdominal pain  -     REFERRAL TO GASTROENTEROLOGY  -     CT ABD PELV WO CONT; Future    4. Pelvic pain  -     CT ABD PELV WO CONT; Future    5. Elevated BP without diagnosis of hypertension    6. Rectocele  -     CT ABD PELV WO CONT; Future      Multiple complaints, likely not all related  For abdominal pain, pelvic pain get CT - also start famotidine - ?gerd and refer to GI for ongoing sx  Suspect some of lower abdominal pain due to weak pelvic floor muscles- managed by gynecology and has follow up  Recheck BP at follow up - normally no HTN  Health Maintenance Due   Topic Date Due    COVID-19 Vaccine (1) Never done    Shingrix Vaccine Age 50> (1 of 2) Never done    Low dose CT lung screening  Never done    Breast Cancer Screen Mammogram  10/16/2022        Follow-up and Dispositions    Return in about 1 month (around 11/6/2022) for abdominal pain, nausea. >40 min total spent on visit including review of previous notes, testing, obtaining history, exam, and counseling patient on diagnoses        Reviewed plan of care. Patient has provided input and agrees with goals. The nurse provided the patient and/or family with advanced directive information if needed and encouraged the patient to provide a copy to the office when available.

## 2022-10-06 NOTE — PROGRESS NOTES
Rm    Chief Complaint   Patient presents with    GI Problem     Intestinal prolapse        Visit Vitals  BP (!) 146/89 (BP 1 Location: Left upper arm, BP Patient Position: Sitting, BP Cuff Size: Adult)   Pulse 86   Temp 97.9 °F (36.6 °C) (Oral)   Resp 16   Ht 5' 2\" (1.575 m)   Wt 150 lb 9.6 oz (68.3 kg)   LMP 03/14/2010   SpO2 98%   BMI 27.55 kg/m²        1. Have you been to the ER, urgent care clinic since your last visit? Hospitalized since your last visit? No    2. Have you seen or consulted any other health care providers outside of the [address] [place] [address] since your last visit? Include any pap smears or colon screening. No     Health Maintenance Due   Topic Date Due    Shingrix Vaccine Age 49> (1 of 2) Never done    Low dose CT lung screening  Never done    Depression Screen  10/12/2022    Breast Cancer Screen Mammogram  10/16/2022        3 most recent PHQ Screens 10/6/2022   Little interest or pleasure in doing things Not at all   Feeling down, depressed, irritable, or hopeless Not at all   Total Score PHQ 2 0        Fall Risk Assessment, last 12 mths 10/12/2021   Able to walk? Yes   Fall in past 12 months? 0   Do you feel unsteady?  0   Are you worried about falling 0       Learning Assessment 8/27/2019   PRIMARY LEARNER Patient   HIGHEST LEVEL OF EDUCATION - PRIMARY LEARNER  -   PRIMARY LANGUAGE ENGLISH   LEARNER PREFERENCE PRIMARY READING   ANSWERED BY patient   RELATIONSHIP SELF

## 2022-10-18 ENCOUNTER — HOSPITAL ENCOUNTER (OUTPATIENT)
Dept: CT IMAGING | Age: 62
Discharge: HOME | End: 2022-10-18
Attending: INTERNAL MEDICINE
Payer: COMMERCIAL

## 2022-10-18 DIAGNOSIS — R10.13 EPIGASTRIC PAIN: ICD-10-CM

## 2022-10-18 DIAGNOSIS — R11.0 NAUSEA: ICD-10-CM

## 2022-10-18 DIAGNOSIS — N81.6 RECTOCELE: ICD-10-CM

## 2022-10-18 DIAGNOSIS — R10.30 LOWER ABDOMINAL PAIN: ICD-10-CM

## 2022-10-18 DIAGNOSIS — R10.2 PELVIC PAIN: ICD-10-CM

## 2022-10-18 PROCEDURE — 74176 CT ABD & PELVIS W/O CONTRAST: CPT

## 2022-10-21 NOTE — PROGRESS NOTES
This shows moderate constipation which can lead to abdominal discomfort and nausea - add miralax 1-2 doses daily and/or fiber supplement to help with constipation

## 2022-11-08 ENCOUNTER — OFFICE VISIT (OUTPATIENT)
Dept: INTERNAL MEDICINE CLINIC | Age: 62
End: 2022-11-08
Payer: COMMERCIAL

## 2022-11-08 VITALS
HEIGHT: 62 IN | TEMPERATURE: 98 F | HEART RATE: 70 BPM | SYSTOLIC BLOOD PRESSURE: 130 MMHG | DIASTOLIC BLOOD PRESSURE: 80 MMHG | BODY MASS INDEX: 28.6 KG/M2 | OXYGEN SATURATION: 97 % | RESPIRATION RATE: 12 BRPM | WEIGHT: 155.4 LBS

## 2022-11-08 DIAGNOSIS — N81.6 RECTOCELE: ICD-10-CM

## 2022-11-08 DIAGNOSIS — R10.2 PELVIC PAIN: Primary | ICD-10-CM

## 2022-11-08 DIAGNOSIS — R11.0 NAUSEA: ICD-10-CM

## 2022-11-08 PROCEDURE — 99214 OFFICE O/P EST MOD 30 MIN: CPT | Performed by: INTERNAL MEDICINE

## 2022-11-08 RX ORDER — DICYCLOMINE HYDROCHLORIDE 10 MG/1
CAPSULE ORAL
COMMUNITY
Start: 2022-11-02

## 2022-11-08 NOTE — PROGRESS NOTES
HPI  Ms. John Christianson is a 58y.o. year old female, she is seen today for follow up abdominal pain, nausea. Saw GI NP on 11/2. Hasn't heard back yet with a plan. She added dicyclomine - not sure if it's helping yet. Says it seems sx of nausea, sweats, vomiting could be related to vagus nerve  Has seen gyn since last visit - tried larger pessary but also didn't help - actually caused severe cramping, nausea - took it out and now has smaller pessary. Has continued to gain weight - about 2# per week. Says has BM daily, not hard or small. Not sure if symptoms improved since starting famotidine. Feels that the worse rectocele is the worse she feels and it fluctuates throughout the day. She is not scheduled to follow up with gyn until February - advised she call back for sooner appointment. Chief Complaint   Patient presents with    Abdominal Pain    Nausea        Prior to Admission medications    Medication Sig Start Date End Date Taking? Authorizing Provider   dicyclomine (BENTYL) 10 mg capsule TAKE 1 CAPSULE BY MOUTH EVERY 6 HOURS AS NEEDED FOR ABDOMINAL PAIN 11/2/22  Yes Provider, Historical   famotidine (PEPCID) 40 mg tablet Take 1 Tablet by mouth daily. 10/6/22  Yes Ever Fraser MD   multivitamin capsule Take 1 Capsule by mouth daily. Yes Provider, Historical   ZINC ACETATE PO Take  by mouth. Yes Provider, Historical   calcium carb-vitamin D3-vit K2 600 mg-1,000 unit-90 mcg tab Take  by mouth. Yes Provider, Historical         Allergies   Allergen Reactions    Mold Other (comments)     Severe Allergies         REVIEW OF SYSTEMS:  Per HPI    PHYSICAL EXAM:  Visit Vitals  /80   Pulse 70   Temp 98 °F (36.7 °C) (Oral)   Resp 12   Ht 5' 2\" (1.575 m)   Wt 155 lb 6.4 oz (70.5 kg)   LMP 03/14/2010   SpO2 97%   BMI 28.42 kg/m²     Constitutional: Appears well-developed and well-nourished. No distress. HENT:   Head: Normocephalic and atraumatic. Eyes: No scleral icterus. Cardiovascular: Normal S1/S2, regular rhythm. No murmurs, rubs, or gallops. Pulmonary/Chest: Effort normal and breath sounds normal. No respiratory distress. No wheezes, rhonchi, or rales. Ext: No edema. Neurological: Alert. Psychiatric: Normal mood and affect. Behavior is normal.     Lab Results   Component Value Date/Time    Sodium 141 08/27/2019 12:08 PM    Potassium 4.4 08/27/2019 12:08 PM    Chloride 102 08/27/2019 12:08 PM    CO2 25 08/27/2019 12:08 PM    Anion gap 9 10/07/2017 03:56 AM    Glucose 90 08/27/2019 12:08 PM    BUN 14 08/27/2019 12:08 PM    Creatinine 0.82 08/27/2019 12:08 PM    BUN/Creatinine ratio 17 08/27/2019 12:08 PM    GFR est AA 91 08/27/2019 12:08 PM    GFR est non-AA 79 08/27/2019 12:08 PM    Calcium 9.2 08/27/2019 12:08 PM    Bilirubin, total 0.3 08/27/2019 12:08 PM    Alk. phosphatase 95 08/27/2019 12:08 PM    Protein, total 6.6 08/27/2019 12:08 PM    Albumin 4.4 08/27/2019 12:08 PM    Globulin 2.9 10/07/2017 03:56 AM    A-G Ratio 2.0 08/27/2019 12:08 PM    ALT (SGPT) 25 08/27/2019 12:08 PM     Lab Results   Component Value Date/Time    Hemoglobin A1c 5.6 08/27/2019 12:08 PM    Hemoglobin A1c 5.7 (H) 06/15/2017 11:51 AM      Lab Results   Component Value Date/Time    Cholesterol, total 221 (H) 08/27/2019 12:08 PM    HDL Cholesterol 76 08/27/2019 12:08 PM    LDL, calculated 134 (H) 08/27/2019 12:08 PM    VLDL, calculated 11 08/27/2019 12:08 PM    Triglyceride 57 08/27/2019 12:08 PM          ASSESSMENT/PLAN  Diagnoses and all orders for this visit:    1. Pelvic pain    2. Rectocele    3.  Nausea    Nausea may be vagal in nature - will follow up with GI  Has pelvic pain, large rectocele - not improved with pessary - advised calling back GYN for earlier appt    Health Maintenance Due   Topic Date Due    Low dose CT lung screening  Never done    Breast Cancer Screen Mammogram  10/16/2022        Follow-up and Dispositions    Return in about 3 months (around 2/8/2023), or if symptoms worsen or fail to improve, for GI issues . Reviewed plan of care. Patient has provided input and agrees with goals. The nurse provided the patient and/or family with advanced directive information if needed and encouraged the patient to provide a copy to the office when available.

## 2022-11-08 NOTE — PROGRESS NOTES
Kayley Herman  Identified pt with two pt identifiers(name and ). Chief Complaint   Patient presents with    Abdominal Pain    Nausea       Reviewed record In preparation for visit and have obtained necessary documentation. 1. Have you been to the ER, urgent care clinic or hospitalized since your last visit? No     2. Have you seen or consulted any other health care providers outside of the [address] [place] [address] since your last visit? Include any pap smears or colon screening. No    Vitals reviewed with provider.     Health Maintenance reviewed:     Health Maintenance Due   Topic    Low dose CT lung screening     Breast Cancer Screen Mammogram           Wt Readings from Last 3 Encounters:   22 155 lb 6.4 oz (70.5 kg)   10/06/22 150 lb 9.6 oz (68.3 kg)   10/12/21 133 lb 9.6 oz (60.6 kg)        Temp Readings from Last 3 Encounters:   22 98 °F (36.7 °C) (Oral)   10/06/22 97.9 °F (36.6 °C) (Oral)   10/12/21 97.9 °F (36.6 °C) (Oral)        BP Readings from Last 3 Encounters:   22 (!) 143/82   10/06/22 (!) 144/90   10/12/21 127/85        Pulse Readings from Last 3 Encounters:   22 70   10/06/22 86   10/12/21 78        Vitals:    22 1517   BP: (!) 143/82   Pulse: 70   Resp: 12   Temp: 98 °F (36.7 °C)   TempSrc: Oral   SpO2: 97%   Weight: 155 lb 6.4 oz (70.5 kg)   Height: 5' 2\" (1.575 m)   PainSc:   4   PainLoc: Back   LMP: 2010          Learning Assessment:   :       Learning Assessment 2019 6/15/2017   PRIMARY LEARNER Patient Patient   HIGHEST LEVEL OF EDUCATION - PRIMARY LEARNER  - GRADUATED HIGH SCHOOL OR GED   PRIMARY LANGUAGE ENGLISH ENGLISH   LEARNER PREFERENCE PRIMARY READING DEMONSTRATION   ANSWERED BY patient patient   RELATIONSHIP SELF SELF        Depression Screening:   :       3 most recent PHQ Screens 2022   Little interest or pleasure in doing things Not at all   Feeling down, depressed, irritable, or hopeless Not at all   Total Score PHQ 2 0 Fall Risk Assessment:   :       Fall Risk Assessment, last 12 mths 10/12/2021   Able to walk? Yes   Fall in past 12 months? 0   Do you feel unsteady? 0   Are you worried about falling 0        Abuse Screening:   :       Abuse Screening Questionnaire 11/8/2022 10/12/2021 8/31/2020 8/27/2019 6/15/2017   Do you ever feel afraid of your partner? N N N N N   Are you in a relationship with someone who physically or mentally threatens you? N N N N N   Is it safe for you to go home?  Y Y Y Y Y        ADL Screening:   :       ADL Assessment 10/12/2021   Feeding yourself No Help Needed   Getting from bed to chair No Help Needed   Getting dressed No Help Needed   Bathing or showering No Help Needed   Walk across the room (includes cane/walker) No Help Needed   Using the telphone No Help Needed   Taking your medications No Help Needed   Preparing meals No Help Needed   Managing money (expenses/bills) No Help Needed   Moderately strenuous housework (laundry) No Help Needed   Shopping for personal items (toiletries/medicines) No Help Needed   Shopping for groceries No Help Needed   Driving No Help Needed   Climbing a flight of stairs No Help Needed   Getting to places beyond walking distances No Help Needed

## 2022-12-27 DIAGNOSIS — R11.0 NAUSEA: ICD-10-CM

## 2022-12-27 RX ORDER — FAMOTIDINE 40 MG/1
TABLET, FILM COATED ORAL
Qty: 30 TABLET | Refills: 2 | Status: SHIPPED | OUTPATIENT
Start: 2022-12-27

## 2023-02-22 ENCOUNTER — OFFICE VISIT (OUTPATIENT)
Dept: INTERNAL MEDICINE CLINIC | Age: 63
End: 2023-02-22
Payer: COMMERCIAL

## 2023-02-22 VITALS
OXYGEN SATURATION: 98 % | HEART RATE: 76 BPM | TEMPERATURE: 98.1 F | SYSTOLIC BLOOD PRESSURE: 138 MMHG | BODY MASS INDEX: 28.8 KG/M2 | WEIGHT: 156.5 LBS | DIASTOLIC BLOOD PRESSURE: 84 MMHG | RESPIRATION RATE: 12 BRPM | HEIGHT: 62 IN

## 2023-02-22 DIAGNOSIS — N81.9 FEMALE GENITAL PROLAPSE, UNSPECIFIED TYPE: ICD-10-CM

## 2023-02-22 DIAGNOSIS — R03.0 ELEVATED BP WITHOUT DIAGNOSIS OF HYPERTENSION: Primary | ICD-10-CM

## 2023-02-22 PROCEDURE — 99213 OFFICE O/P EST LOW 20 MIN: CPT | Performed by: INTERNAL MEDICINE

## 2023-02-22 RX ORDER — ONDANSETRON 4 MG/1
TABLET, ORALLY DISINTEGRATING ORAL
COMMUNITY
Start: 2022-11-30

## 2023-02-22 RX ORDER — CHOLECALCIFEROL (VITAMIN D3) 50 MCG
CAPSULE ORAL
COMMUNITY

## 2023-02-22 NOTE — PROGRESS NOTES
Winnie Ramirez  Identified pt with two pt identifiers(name and ). Chief Complaint   Patient presents with    Nausea    Abdominal Pain       Reviewed record In preparation for visit and have obtained necessary documentation. 1. Have you been to the ER, urgent care clinic or hospitalized since your last visit? No     2. Have you seen or consulted any other health care providers outside of the [address] [place] [address] since your last visit? Include any pap smears or colon screening. Dr Swathi Branch reviewed with provider.     Health Maintenance reviewed:     Health Maintenance Due   Topic    Low dose CT lung screening           Wt Readings from Last 3 Encounters:   23 156 lb 8 oz (71 kg)   22 155 lb 6.4 oz (70.5 kg)   10/06/22 150 lb 9.6 oz (68.3 kg)        Temp Readings from Last 3 Encounters:   23 98.1 °F (36.7 °C) (Oral)   22 98 °F (36.7 °C) (Oral)   10/06/22 97.9 °F (36.6 °C) (Oral)        BP Readings from Last 3 Encounters:   23 (!) 145/90   22 130/80   10/06/22 (!) 144/90        Pulse Readings from Last 3 Encounters:   23 76   22 70   10/06/22 86        Vitals:    23 1448   BP: (!) 145/90   Pulse: 76   Resp: 12   Temp: 98.1 °F (36.7 °C)   TempSrc: Oral   SpO2: 98%   Weight: 156 lb 8 oz (71 kg)   Height: 5' 2\" (1.575 m)   PainSc:   3   PainLoc: Generalized   LMP: 2010          Learning Assessment:   :       Learning Assessment 2019 6/15/2017   PRIMARY LEARNER Patient Patient   HIGHEST LEVEL OF EDUCATION - PRIMARY LEARNER  - GRADUATED HIGH SCHOOL OR GED   PRIMARY LANGUAGE ENGLISH ENGLISH   LEARNER PREFERENCE PRIMARY READING DEMONSTRATION   ANSWERED BY patient patient   RELATIONSHIP SELF SELF        Depression Screening:   :       3 most recent PHQ Screens 2023   Little interest or pleasure in doing things Not at all   Feeling down, depressed, irritable, or hopeless Several days   Total Score PHQ 2 1        Fall Risk Assessment: :       Fall Risk Assessment, last 12 mths 10/12/2021   Able to walk? Yes   Fall in past 12 months? 0   Do you feel unsteady? 0   Are you worried about falling 0        Abuse Screening:   :       Abuse Screening Questionnaire 11/8/2022 10/12/2021 8/31/2020 8/27/2019 6/15/2017   Do you ever feel afraid of your partner? N N N N N   Are you in a relationship with someone who physically or mentally threatens you? N N N N N   Is it safe for you to go home?  Y Y Y Y Y        ADL Screening:   :       ADL Assessment 10/12/2021   Feeding yourself No Help Needed   Getting from bed to chair No Help Needed   Getting dressed No Help Needed   Bathing or showering No Help Needed   Walk across the room (includes cane/walker) No Help Needed   Using the telphone No Help Needed   Taking your medications No Help Needed   Preparing meals No Help Needed   Managing money (expenses/bills) No Help Needed   Moderately strenuous housework (laundry) No Help Needed   Shopping for personal items (toiletries/medicines) No Help Needed   Shopping for groceries No Help Needed   Driving No Help Needed   Climbing a flight of stairs No Help Needed   Getting to places beyond walking distances No Help Needed

## 2023-02-22 NOTE — PROGRESS NOTES
HPI  Ms. Jose Mccollum is a 58y.o. year old female, she is seen today for follow up pelvic prolapse, abdominal pain. Less abdominal cramping and nausea overall. No severe spells of pain    Has seen urogynecologist since last visit - Dr. Nina Wells - told cystocele. She is confused about whether she has rectocele or cystocele as gyn said rectocele. No chest pain or sob    Foreman Dice off some boulders in her yard, landed on her left abdomen - some mild pain - getting better          Chief Complaint   Patient presents with    Nausea    Abdominal Pain        Prior to Admission medications    Medication Sig Start Date End Date Taking? Authorizing Provider   ondansetron (ZOFRAN ODT) 4 mg disintegrating tablet TAKE 1 TABLET BY MOUTH EVERY 6 HOURS AS NEEDED FOR NAUSEA/VOMITING 11/30/22  Yes Provider, Historical   B.animalis,bifid,infantis,long (Probiotic 4X) 10-15 mg TbEC Take  by mouth. Yes Provider, Historical   famotidine (PEPCID) 40 mg tablet TAKE 1 TABLET BY MOUTH EVERY DAY  Patient taking differently: daily as needed. 12/27/22  Yes Zafar Gan MD   dicyclomine (BENTYL) 10 mg capsule TAKE 1 CAPSULE BY MOUTH EVERY 6 HOURS AS NEEDED FOR ABDOMINAL PAIN 11/2/22  Yes Provider, Historical   multivitamin capsule Take 1 Capsule by mouth daily. Yes Provider, Historical   ZINC ACETATE PO Take  by mouth. Yes Provider, Historical   calcium carb-vitamin D3-vit K2 600 mg-1,000 unit-90 mcg tab Take  by mouth. Yes Provider, Historical         Allergies   Allergen Reactions    Mold Other (comments)     Severe Allergies         REVIEW OF SYSTEMS:  Per HPI    PHYSICAL EXAM:  Visit Vitals  /84   Pulse 76   Temp 98.1 °F (36.7 °C) (Oral)   Resp 12   Ht 5' 2\" (1.575 m)   Wt 156 lb 8 oz (71 kg)   LMP 03/14/2010   SpO2 98%   BMI 28.62 kg/m²     Constitutional: Appears well-developed and well-nourished. No distress. HENT:   Head: Normocephalic and atraumatic. Eyes: No scleral icterus.    Cardiovascular: Normal S1/S2, regular rhythm. No murmurs, rubs, or gallops. Pulmonary/Chest: Effort normal and breath sounds normal. No respiratory distress. No wheezes, rhonchi, or rales. Abdomen: Soft, NT/ND, +BS, no rebound or guarding, no masses, no HSM appreciated. Ext: No edema. Neurological: Alert. Psychiatric: Normal mood and affect. Behavior is normal.     Lab Results   Component Value Date/Time    Sodium 141 08/27/2019 12:08 PM    Potassium 4.4 08/27/2019 12:08 PM    Chloride 102 08/27/2019 12:08 PM    CO2 25 08/27/2019 12:08 PM    Anion gap 9 10/07/2017 03:56 AM    Glucose 90 08/27/2019 12:08 PM    BUN 14 08/27/2019 12:08 PM    Creatinine 0.82 08/27/2019 12:08 PM    BUN/Creatinine ratio 17 08/27/2019 12:08 PM    GFR est AA 91 08/27/2019 12:08 PM    GFR est non-AA 79 08/27/2019 12:08 PM    Calcium 9.2 08/27/2019 12:08 PM    Bilirubin, total 0.3 08/27/2019 12:08 PM    Alk. phosphatase 95 08/27/2019 12:08 PM    Protein, total 6.6 08/27/2019 12:08 PM    Albumin 4.4 08/27/2019 12:08 PM    Globulin 2.9 10/07/2017 03:56 AM    A-G Ratio 2.0 08/27/2019 12:08 PM    ALT (SGPT) 25 08/27/2019 12:08 PM     Lab Results   Component Value Date/Time    Hemoglobin A1c 5.6 08/27/2019 12:08 PM    Hemoglobin A1c 5.7 (H) 06/15/2017 11:51 AM      Lab Results   Component Value Date/Time    Cholesterol, total 221 (H) 08/27/2019 12:08 PM    HDL Cholesterol 76 08/27/2019 12:08 PM    LDL, calculated 134 (H) 08/27/2019 12:08 PM    VLDL, calculated 11 08/27/2019 12:08 PM    Triglyceride 57 08/27/2019 12:08 PM          ASSESSMENT/PLAN  Diagnoses and all orders for this visit:    1. Elevated BP without diagnosis of hypertension    2.  Female genital prolapse, unspecified type  -     REFERRAL TO UROGYNECOLOGY    BP acceptable  Would like 2nd opinion regarding pelvic prolapse and referral given    Health Maintenance Due   Topic Date Due    Low dose CT lung screening  Never done        Follow-up and Dispositions    Return in about 6 months (around 8/22/2023) for nausea, 30 MIN APPT. Reviewed plan of care. Patient has provided input and agrees with goals. The nurse provided the patient and/or family with advanced directive information if needed and encouraged the patient to provide a copy to the office when available.

## 2025-04-18 ENCOUNTER — TELEPHONE (OUTPATIENT)
Facility: CLINIC | Age: 65
End: 2025-04-18

## 2025-04-18 NOTE — TELEPHONE ENCOUNTER
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 She would like to know if pcp can write a note to excuse her

## 2025-04-25 NOTE — TELEPHONE ENCOUNTER
PSBETINA Rome called pt to schedule appt pt was unable to do any of the appointments that we given to her

## 2025-05-12 ENCOUNTER — OFFICE VISIT (OUTPATIENT)
Facility: CLINIC | Age: 65
End: 2025-05-12
Payer: COMMERCIAL

## 2025-05-12 VITALS
HEART RATE: 85 BPM | TEMPERATURE: 97.9 F | WEIGHT: 153.2 LBS | RESPIRATION RATE: 18 BRPM | HEIGHT: 62 IN | SYSTOLIC BLOOD PRESSURE: 138 MMHG | OXYGEN SATURATION: 96 % | DIASTOLIC BLOOD PRESSURE: 90 MMHG | BODY MASS INDEX: 28.19 KG/M2

## 2025-05-12 DIAGNOSIS — M25.50 ARTHRALGIA, UNSPECIFIED JOINT: ICD-10-CM

## 2025-05-12 DIAGNOSIS — R53.82 CHRONIC FATIGUE: ICD-10-CM
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
## 2025-05-12 DIAGNOSIS — R03.0 ELEVATED BP WITHOUT DIAGNOSIS OF HYPERTENSION: ICD-10-CM

## 2025-05-12 DIAGNOSIS — M79.10 MYALGIA: ICD-10-CM

## 2025-05-12 DIAGNOSIS — S40.269A TICK BITE OF SHOULDER, UNSPECIFIED LATERALITY, INITIAL ENCOUNTER: Primary | ICD-10-CM

## 2025-05-12 PROCEDURE — G8400 PT W/DXA NO RESULTS DOC: HCPCS | Performed by: INTERNAL MEDICINE

## 2025-05-12 PROCEDURE — G8419 CALC BMI OUT NRM PARAM NOF/U: HCPCS | Performed by: INTERNAL MEDICINE

## 2025-05-12 PROCEDURE — 1123F ACP DISCUSS/DSCN MKR DOCD: CPT | Performed by: INTERNAL MEDICINE

## 2025-05-12 PROCEDURE — 3017F COLORECTAL CA SCREEN DOC REV: CPT | Performed by: INTERNAL MEDICINE

## 2025-05-12 PROCEDURE — 1090F PRES/ABSN URINE INCON ASSESS: CPT | Performed by: INTERNAL MEDICINE

## 2025-05-12 PROCEDURE — G8427 DOCREV CUR MEDS BY ELIG CLIN: HCPCS | Performed by: INTERNAL MEDICINE

## 2025-05-12 PROCEDURE — 99214 OFFICE O/P EST MOD 30 MIN: CPT | Performed by: INTERNAL MEDICINE

## 2025-05-12 PROCEDURE — 1036F TOBACCO NON-USER: CPT | Performed by: INTERNAL MEDICINE

## 2025-05-12 RX ORDER — DOXYCYCLINE HYCLATE 100 MG
100 TABLET ORAL 2 TIMES DAILY
Qty: 28 TABLET | Refills: 0 | Status: SHIPPED | OUTPATIENT
Start: 2025-05-12 | End: 2025-05-26

## 2025-05-12 SDOH — ECONOMIC STABILITY: FOOD INSECURITY: WITHIN THE PAST 12 MONTHS, THE FOOD YOU BOUGHT JUST DIDN'T LAST AND YOU DIDN'T HAVE MONEY TO GET MORE.: NEVER TRUE

## 2025-05-12 SDOH — ECONOMIC STABILITY: FOOD INSECURITY: WITHIN THE PAST 12 MONTHS, YOU WORRIED THAT YOUR FOOD WOULD RUN OUT BEFORE YOU GOT MONEY TO BUY MORE.: NEVER TRUE

## 2025-05-12 ASSESSMENT — PATIENT HEALTH QUESTIONNAIRE - PHQ9
SUM OF ALL RESPONSES TO PHQ QUESTIONS 1-9: 0
1. LITTLE INTEREST OR PLEASURE IN DOING THINGS: NOT AT ALL
SUM OF ALL RESPONSES TO PHQ QUESTIONS 1-9: 0
2. FEELING DOWN, DEPRESSED OR HOPELESS: NOT AT ALL
SUM OF ALL RESPONSES TO PHQ QUESTIONS 1-9: 0
SUM OF ALL RESPONSES TO PHQ QUESTIONS 1-9: 0

## 2025-05-12 NOTE — PROGRESS NOTES
Have you been to the ER, urgent care clinic since your last visit?  Hospitalized since your last visit?   NO    Have you seen or consulted any other health care providers outside our system since your last visit?   NO    Have you had a mammogram?”   NO    Date of last Mammogram: 10/20/2022       “Have you had a colorectal cancer screening such as a colonoscopy/FIT/Cologuard?    NO    No colonoscopy on file  Date of last Cologuard: 9/26/2020  No FIT/FOBT on file   No flexible sigmoidoscopy on file

## 2025-05-12 NOTE — PROGRESS NOTES
HPI  Ms. Iliana Thompson is a 65 y.o. year old female, she is seen today for multiple symptoms.  Last seen by me in 2/2023 and was advised to follow up in 8/2023 but did not.     Today:  Says she had lyme disease about 2009 - so severe that it was recommended she be hospitalized but she declined.   Was treated with \"at least 2 mos of antibiotics\".   Symptoms resolved until she got covid infection in 2022.     A few weeks ago - at least 3 weeks ago started having HA, sweats without triggers. Had aching in both wrists, back, legs and back of skull. Different areas of pain which would come and go.   Had been bit by a tick about a month ago - not sure how long tick had been attached - has had about 10 bites in the last 3 weeks per patient.   Had a rash in area of 2 tick bites on back. Not bulls eye.   Complains of intermittent nausea, aching pains. Last night had sharp pain in middle of back which made it hard to breathe - lasted about 30 min total. Woke her from sleep. No indigestion or heartburn. No pain in stomach.     BP at home normally about 128 systolic.  Denies red, hot, swollen joints.  Chief Complaint   Patient presents with    Other     Possible Lymes disease  Excessive sweats        Prior to Admission medications    Medication Sig Start Date End Date Taking? Authorizing Provider   doxycycline hyclate (VIBRA-TABS) 100 MG tablet Take 1 tablet by mouth 2 times daily for 14 days 5/12/25 5/26/25 Yes Melidna Pathak MD   Zinc Acetate, Oral, (ZINC ACETATE PO) Take by mouth   Yes Automatic Reconciliation, Ar         Allergies   Allergen Reactions    Molds & Smuts Other (See Comments)     Severe Allergies         REVIEW OF SYSTEMS:  Per HPI    PHYSICAL EXAM:  BP (!) 138/90   Pulse 85   Temp 97.9 °F (36.6 °C) (Oral)   Resp 18   Ht 1.575 m (5' 2\")   Wt 69.5 kg (153 lb 3.2 oz)   SpO2 96%   BMI 28.02 kg/m²   Constitutional: Appears well-developed and well-nourished. No distress.   HENT:   Head: Normocephalic and

## 2025-05-13 ENCOUNTER — LAB (OUTPATIENT)
Facility: CLINIC | Age: 65
End: 2025-05-13

## 2025-05-13 DIAGNOSIS — M79.10 MYALGIA: ICD-10-CM
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
## 2025-05-13 DIAGNOSIS — R53.82 CHRONIC FATIGUE: ICD-10-CM

## 2025-05-13 DIAGNOSIS — S40.269A TICK BITE OF SHOULDER, UNSPECIFIED LATERALITY, INITIAL ENCOUNTER: ICD-10-CM

## 2025-05-13 DIAGNOSIS — M25.50 ARTHRALGIA, UNSPECIFIED JOINT: ICD-10-CM

## 2025-05-14 LAB
ALBUMIN SERPL-MCNC: 3.7 G/DL (ref 3.5–5)
ALBUMIN/GLOB SERPL: 1.2 (ref 1.1–2.2)
ALP SERPL-CCNC: 104 U/L (ref 45–117)
ALT SERPL-CCNC: 15 U/L (ref 12–78)
ANION GAP SERPL CALC-SCNC: 1 MMOL/L (ref 2–12)
AST SERPL-CCNC: 14 U/L (ref 15–37)
BASOPHILS # BLD: 0.07 K/UL (ref 0–0.1)
BASOPHILS NFR BLD: 1.1 % (ref 0–1)
BILIRUB SERPL-MCNC: 0.5 MG/DL (ref 0.2–1)
BUN SERPL-MCNC: 18 MG/DL (ref 6–20)
BUN/CREAT SERPL: 19 (ref 12–20)
CALCIUM SERPL-MCNC: 9.3 MG/DL (ref 8.5–10.1)
CHLORIDE SERPL-SCNC: 105 MMOL/L (ref 97–108)
CK SERPL-CCNC: 91 U/L (ref 26–192)
CO2 SERPL-SCNC: 30 MMOL/L (ref 21–32)
CREAT SERPL-MCNC: 0.95 MG/DL (ref 0.55–1.02)
CRP SERPL-MCNC: <0.29 MG/DL (ref 0–0.3)
DIFFERENTIAL METHOD BLD: ABNORMAL
EOSINOPHIL # BLD: 0.07 K/UL (ref 0–0.4)
EOSINOPHIL NFR BLD: 1.1 % (ref 0–7)
ERYTHROCYTE [DISTWIDTH] IN BLOOD BY AUTOMATED COUNT: 12.7 % (ref 11.5–14.5)
ERYTHROCYTE [SEDIMENTATION RATE] IN BLOOD: 10 MM/HR (ref 0–30)
GLOBULIN SER CALC-MCNC: 3.1 G/DL (ref 2–4)
GLUCOSE SERPL-MCNC: 94 MG/DL (ref 65–100)
HCT VFR BLD AUTO: 41.2 % (ref 35–47)
HGB BLD-MCNC: 13.4 G/DL (ref 11.5–16)
IMM GRANULOCYTES # BLD AUTO: 0.01 K/UL (ref 0–0.04)
IMM GRANULOCYTES NFR BLD AUTO: 0.2 % (ref 0–0.5)
LYMPHOCYTES # BLD: 1.38 K/UL (ref 0.8–3.5)
LYMPHOCYTES NFR BLD: 22 % (ref 12–49)
MCH RBC QN AUTO: 30.3 PG (ref 26–34)
MCHC RBC AUTO-ENTMCNC: 32.5 G/DL (ref 30–36.5)
MCV RBC AUTO: 93.2 FL (ref 80–99)
MONOCYTES # BLD: 0.5 K/UL (ref 0–1)
MONOCYTES NFR BLD: 8 % (ref 5–13)
NEUTS SEG # BLD: 4.23 K/UL (ref 1.8–8)
NEUTS SEG NFR BLD: 67.6 % (ref 32–75)
NRBC # BLD: 0 K/UL (ref 0–0.01)
NRBC BLD-RTO: 0 PER 100 WBC
PLATELET # BLD AUTO: 284 K/UL (ref 150–400)
PMV BLD AUTO: 10 FL (ref 8.9–12.9)
POTASSIUM SERPL-SCNC: 4.2 MMOL/L (ref 3.5–5.1)
PROT SERPL-MCNC: 6.8 G/DL (ref 6.4–8.2)
RBC # BLD AUTO: 4.42 M/UL (ref 3.8–5.2)
SODIUM SERPL-SCNC: 136 MMOL/L (ref 136–145)
T4 FREE SERPL-MCNC: 1.3 NG/DL (ref 0.8–1.5)
TSH SERPL DL<=0.05 MIU/L-ACNC: 0.98 UIU/ML (ref 0.36–3.74)
WBC # BLD AUTO: 6.3 K/UL (ref 3.6–11)

## 2025-05-15 LAB — LYME ANTIBODY: NEGATIVE

## 2025-05-16 ENCOUNTER — RESULTS FOLLOW-UP (OUTPATIENT)
Facility: CLINIC | Age: 65
End: 2025-05-16